# Patient Record
Sex: FEMALE | Race: WHITE | HISPANIC OR LATINO | Employment: FULL TIME | URBAN - METROPOLITAN AREA
[De-identification: names, ages, dates, MRNs, and addresses within clinical notes are randomized per-mention and may not be internally consistent; named-entity substitution may affect disease eponyms.]

---

## 2023-05-25 ENCOUNTER — APPOINTMENT (OUTPATIENT)
Dept: PRIMARY CARE | Facility: CLINIC | Age: 38
End: 2023-05-25
Payer: COMMERCIAL

## 2023-08-16 ENCOUNTER — APPOINTMENT (OUTPATIENT)
Dept: PRIMARY CARE | Facility: CLINIC | Age: 38
End: 2023-08-16
Payer: COMMERCIAL

## 2023-10-04 ENCOUNTER — OFFICE VISIT (OUTPATIENT)
Dept: PRIMARY CARE | Facility: CLINIC | Age: 38
End: 2023-10-04
Payer: COMMERCIAL

## 2023-10-04 ENCOUNTER — TELEPHONE (OUTPATIENT)
Dept: CARDIOLOGY | Facility: CLINIC | Age: 38
End: 2023-10-04

## 2023-10-04 VITALS
DIASTOLIC BLOOD PRESSURE: 114 MMHG | OXYGEN SATURATION: 96 % | RESPIRATION RATE: 18 BRPM | HEIGHT: 61 IN | TEMPERATURE: 97 F | SYSTOLIC BLOOD PRESSURE: 155 MMHG | BODY MASS INDEX: 37 KG/M2 | WEIGHT: 196 LBS

## 2023-10-04 DIAGNOSIS — E03.9 HYPOTHYROIDISM, UNSPECIFIED TYPE: ICD-10-CM

## 2023-10-04 DIAGNOSIS — O13.3 GESTATIONAL HYPERTENSION, THIRD TRIMESTER (HHS-HCC): Primary | ICD-10-CM

## 2023-10-04 DIAGNOSIS — E66.9 CLASS 2 OBESITY WITH BODY MASS INDEX (BMI) OF 37.0 TO 37.9 IN ADULT, UNSPECIFIED OBESITY TYPE, UNSPECIFIED WHETHER SERIOUS COMORBIDITY PRESENT: ICD-10-CM

## 2023-10-04 DIAGNOSIS — I10 PRIMARY HYPERTENSION: ICD-10-CM

## 2023-10-04 PROBLEM — O20.9 BLEEDING IN EARLY PREGNANCY (HHS-HCC): Status: ACTIVE | Noted: 2023-10-04

## 2023-10-04 PROBLEM — O13.9 GESTATIONAL HTN (HHS-HCC): Status: ACTIVE | Noted: 2023-10-04

## 2023-10-04 PROBLEM — O20.0: Status: ACTIVE | Noted: 2023-10-04

## 2023-10-04 PROCEDURE — 93000 ELECTROCARDIOGRAM COMPLETE: CPT | Performed by: FAMILY MEDICINE

## 2023-10-04 PROCEDURE — 1036F TOBACCO NON-USER: CPT | Performed by: FAMILY MEDICINE

## 2023-10-04 PROCEDURE — 3080F DIAST BP >= 90 MM HG: CPT | Performed by: FAMILY MEDICINE

## 2023-10-04 PROCEDURE — 3077F SYST BP >= 140 MM HG: CPT | Performed by: FAMILY MEDICINE

## 2023-10-04 PROCEDURE — 3008F BODY MASS INDEX DOCD: CPT | Performed by: FAMILY MEDICINE

## 2023-10-04 PROCEDURE — 99214 OFFICE O/P EST MOD 30 MIN: CPT | Performed by: FAMILY MEDICINE

## 2023-10-04 RX ORDER — LABETALOL 100 MG/1
100 TABLET, FILM COATED ORAL 2 TIMES DAILY
COMMUNITY
Start: 2023-02-06 | End: 2023-11-03 | Stop reason: ALTCHOICE

## 2023-10-04 RX ORDER — LABETALOL 200 MG/1
200 TABLET, FILM COATED ORAL 2 TIMES DAILY
Qty: 60 TABLET | Refills: 5 | Status: SHIPPED | OUTPATIENT
Start: 2023-10-04 | End: 2023-10-26

## 2023-10-04 NOTE — PATIENT INSTRUCTIONS
Please consider exercise program involving walking or some other form of aerobic activity 5 days weekly for 30 minutes... Let's also consider strengthening of large muscle groups like the abdominal muscles or shoulder muscles... Twice weekly with reps of 5/10/15 exercises and gradually increase strength.. This is not heavy strength training but light weight training... Sit ups or back exercise routine.. Please ask for handout if uncertain how to do..This  will help to strengthen your muscles which in turn will help you to lose weight.... You might ask what is the best diet available.. I would strongly encourage you to consider  Weight Watchers.. And as  your  fellow on  Weight Watchers physician attempting to  live this  LIFE  style  choice with you....  I will be glad to give you recommendations on what to eat.. Consider buying Sarita bread.., gayle bagle thin bread.. oikos yogurt... eggs  to eat as hard-boiled... Halo top ice cream for snack... All these are delicious foods which.. when eaten and  being compliant eating three  meals daily  breakfast lunch and dinner, drinking  64 ounces of water daily we will all win together !!!!!!!. This will be a means for you to lose weight... Consider also the smart phone antonette ... My plate.. Or My  fitness  pal..,  as additional possibilities for weight loss... Good  lucswapnil Fernandez!    Discussed medication side effects.  The  risk benefits and treatment options  discussed with patient.       Please schedule follow-up appointment based upon your improvement/failure to improve/chronic medical conditions and physician recommendations during office appointment at the .       Patient advised to go to er if symptoms worsen or to call answering service, or to return to office for additional evaluation    This note was partially  generated using Dragon voice recognition and there may be incorrect words, wording, spelling, or pronunciation errors that were not  corrected prior to committing the note to the medical record.     Hypertension is poorly controlled.  EKG reviewed.  Symptoms include headache.  Sometimes only takes once daily.  Will increase to 200 mg every 12 hours.  Advised to monitor blood pressure at home with low-salt diet.  Please see wrap-up regarding exercise routine.  In light of degree of elevation order home sleep study.  In addition as precaution cardiology opinion with possible stress echo or other.  Cardiology input for blood pressure will be welcome.  Recheck office nurse check BP 2 weeks.  Office follow-up 4 weeks.

## 2023-10-04 NOTE — ASSESSMENT & PLAN NOTE
Hypertension is poorly controlled.  EKG reviewed.  Symptoms include headache.  Sometimes only takes once daily.  Will increase to 200 mg every 12 hours.  Advised to monitor blood pressure at home with low-salt diet.  Please see wrap-up regarding exercise routine.  In light of degree of elevation order home sleep study.  In addition as precaution cardiology opinion with possible stress echo or other.  Cardiology input for blood pressure will be welcome.  Recheck office nurse check BP 2 weeks.  Office follow-up 4 weeks.

## 2023-10-04 NOTE — PROGRESS NOTES
"Subjective   Patient ID: Luis Antonio Clark is a 37 y.o. female who presents for Blood Pressure Check (3rd bp check 155/114).    HPI   Patient is also here for follow-up of hypertension.. Symptoms do  include headache  no confusion visual disturbance dizziness shortness of breath chest pain syncope or palpitations. Recent blood pressure patient has not been checking. By report there is good compliance with treatment. Pertinent medical history includesobesity   Review of Systems  cardiovascular:  no  palpitations or chest  pain  respiratory: no  shortness  of  breath  endocrine:  no polydipsia,  no polyuria  musculoskeletal:  no  myalgia.. no arthralgia  All other  systems discussed  negative   Objective   BP (!) 155/114   Temp 36.1 °C (97 °F)   Resp 18   Ht 1.549 m (5' 1\")   Wt 88.9 kg (196 lb)   SpO2 96%   BMI 37.03 kg/m²     Physical Exam  general: alert oriented x three  HEENT hearing normal to voice  Neck supple  Lungs respirations non-labored.  Cardiovascular: no peripheral edema  Skin: warm and dry without rash  Psych: judgement and insight normal  Musculoskeletal:  ambulation normal,    lymph:negative cervical  LYMPADENOPATHY  thyroid: non palpable enlargement   CMP dated 3/19 showed kidney function within normal limits liver test within the visualized EKG/  Assessment/Plan   Problem List Items Addressed This Visit             ICD-10-CM    Gestational HTN - Primary O13.9    Hypertension I10     Hypertension is poorly controlled.  EKG reviewed.  Symptoms include headache.  Sometimes only takes once daily.  Will increase to 200 mg every 12 hours.  Advised to monitor blood pressure at home with low-salt diet.  Please see wrap-up regarding exercise routine.  In light of degree of elevation order home sleep study.  In addition as precaution cardiology opinion with possible stress echo or other.  Cardiology input for blood pressure will be welcome.  Recheck office nurse check BP 2 weeks.  Office follow-up 4 " weeks.         Obesity E66.9   See wrap up   EKG shows normal sinus rhythm nonspecific ST segment changes  CT interval 170 ms  QRS 94 ms  QTc 445 ms  Heart rate 64 bpm..  Monitor pulse rate at home with increased med under 50 call

## 2023-10-06 ENCOUNTER — LAB (OUTPATIENT)
Dept: LAB | Facility: LAB | Age: 38
End: 2023-10-06
Payer: COMMERCIAL

## 2023-10-06 DIAGNOSIS — O13.3 GESTATIONAL HYPERTENSION, THIRD TRIMESTER (HHS-HCC): ICD-10-CM

## 2023-10-06 DIAGNOSIS — E03.9 HYPOTHYROIDISM, UNSPECIFIED TYPE: ICD-10-CM

## 2023-10-06 DIAGNOSIS — I10 PRIMARY HYPERTENSION: ICD-10-CM

## 2023-10-06 LAB
ALBUMIN SERPL BCP-MCNC: 4.1 G/DL (ref 3.4–5)
ALP SERPL-CCNC: 99 U/L (ref 33–110)
ALT SERPL W P-5'-P-CCNC: 33 U/L (ref 7–45)
ANION GAP SERPL CALC-SCNC: 14 MMOL/L (ref 10–20)
AST SERPL W P-5'-P-CCNC: 27 U/L (ref 9–39)
BILIRUB DIRECT SERPL-MCNC: 0.1 MG/DL (ref 0–0.3)
BILIRUB SERPL-MCNC: 0.6 MG/DL (ref 0–1.2)
BUN SERPL-MCNC: 8 MG/DL (ref 6–23)
CALCIUM SERPL-MCNC: 9.2 MG/DL (ref 8.6–10.3)
CHLORIDE SERPL-SCNC: 112 MMOL/L (ref 98–107)
CHOLEST SERPL-MCNC: 175 MG/DL (ref 0–199)
CHOLESTEROL/HDL RATIO: 6.8
CO2 SERPL-SCNC: 26 MMOL/L (ref 21–32)
CREAT SERPL-MCNC: 0.71 MG/DL (ref 0.5–1.05)
ERYTHROCYTE [DISTWIDTH] IN BLOOD BY AUTOMATED COUNT: 15.3 % (ref 11.5–14.5)
GFR SERPL CREATININE-BSD FRML MDRD: >90 ML/MIN/1.73M*2
GLUCOSE SERPL-MCNC: 86 MG/DL (ref 74–99)
HCT VFR BLD AUTO: 36.1 % (ref 36–46)
HDLC SERPL-MCNC: 25.7 MG/DL
HGB BLD-MCNC: 12 G/DL (ref 12–16)
LDLC SERPL CALC-MCNC: 84 MG/DL (ref 130–180)
MCH RBC QN AUTO: 26.4 PG (ref 26–34)
MCHC RBC AUTO-ENTMCNC: 33.2 G/DL (ref 32–36)
MCV RBC AUTO: 79 FL (ref 80–100)
NON HDL CHOLESTEROL: 149 MG/DL (ref 0–149)
NRBC BLD-RTO: 0 /100 WBCS (ref 0–0)
PLATELET # BLD AUTO: 337 X10*3/UL (ref 150–450)
PMV BLD AUTO: 9.4 FL (ref 7.5–11.5)
POTASSIUM SERPL-SCNC: 4.1 MMOL/L (ref 3.5–5.3)
PROT SERPL-MCNC: 7.5 G/DL (ref 6.4–8.2)
RBC # BLD AUTO: 4.55 X10*6/UL (ref 4–5.2)
SODIUM SERPL-SCNC: 148 MMOL/L (ref 136–145)
TRIGL SERPL-MCNC: 327 MG/DL (ref 0–149)
TSH SERPL-ACNC: 1.13 MIU/L (ref 0.44–3.98)
VLDL: 65 MG/DL (ref 0–40)
WBC # BLD AUTO: 7.3 X10*3/UL (ref 4.4–11.3)

## 2023-10-06 PROCEDURE — 84443 ASSAY THYROID STIM HORMONE: CPT

## 2023-10-06 PROCEDURE — 80053 COMPREHEN METABOLIC PANEL: CPT

## 2023-10-06 PROCEDURE — 85027 COMPLETE CBC AUTOMATED: CPT

## 2023-10-06 PROCEDURE — 80061 LIPID PANEL: CPT

## 2023-10-06 PROCEDURE — 36415 COLL VENOUS BLD VENIPUNCTURE: CPT

## 2023-10-06 PROCEDURE — 82248 BILIRUBIN DIRECT: CPT

## 2023-10-09 DIAGNOSIS — E87.0 HYPERNATREMIA: Primary | ICD-10-CM

## 2023-10-10 ENCOUNTER — OFFICE VISIT (OUTPATIENT)
Dept: PRIMARY CARE | Facility: CLINIC | Age: 38
End: 2023-10-10
Payer: COMMERCIAL

## 2023-10-10 VITALS — DIASTOLIC BLOOD PRESSURE: 89 MMHG | SYSTOLIC BLOOD PRESSURE: 130 MMHG

## 2023-10-10 DIAGNOSIS — I10 PRIMARY HYPERTENSION: ICD-10-CM

## 2023-10-10 PROCEDURE — 3075F SYST BP GE 130 - 139MM HG: CPT | Performed by: FAMILY MEDICINE

## 2023-10-10 PROCEDURE — 99024 POSTOP FOLLOW-UP VISIT: CPT | Performed by: FAMILY MEDICINE

## 2023-10-10 PROCEDURE — 1036F TOBACCO NON-USER: CPT | Performed by: FAMILY MEDICINE

## 2023-10-10 PROCEDURE — 3079F DIAST BP 80-89 MM HG: CPT | Performed by: FAMILY MEDICINE

## 2023-10-10 PROCEDURE — 3008F BODY MASS INDEX DOCD: CPT | Performed by: FAMILY MEDICINE

## 2023-10-11 ENCOUNTER — APPOINTMENT (OUTPATIENT)
Dept: CARDIOLOGY | Facility: CLINIC | Age: 38
End: 2023-10-11
Payer: COMMERCIAL

## 2023-10-12 ENCOUNTER — APPOINTMENT (OUTPATIENT)
Dept: CARDIOLOGY | Facility: CLINIC | Age: 38
End: 2023-10-12
Payer: COMMERCIAL

## 2023-10-26 DIAGNOSIS — I10 PRIMARY HYPERTENSION: ICD-10-CM

## 2023-10-26 RX ORDER — LABETALOL 200 MG/1
1 TABLET, FILM COATED ORAL 2 TIMES DAILY
Qty: 180 TABLET | Refills: 3 | Status: SHIPPED | OUTPATIENT
Start: 2023-10-26 | End: 2023-11-03 | Stop reason: SDUPTHER

## 2023-10-31 ENCOUNTER — LAB (OUTPATIENT)
Dept: LAB | Facility: LAB | Age: 38
End: 2023-10-31
Payer: COMMERCIAL

## 2023-10-31 DIAGNOSIS — E87.0 HYPERNATREMIA: ICD-10-CM

## 2023-10-31 LAB
ANION GAP SERPL CALC-SCNC: 11 MMOL/L (ref 10–20)
BUN SERPL-MCNC: 9 MG/DL (ref 6–23)
CALCIUM SERPL-MCNC: 9.4 MG/DL (ref 8.6–10.3)
CHLORIDE SERPL-SCNC: 102 MMOL/L (ref 98–107)
CO2 SERPL-SCNC: 29 MMOL/L (ref 21–32)
CREAT SERPL-MCNC: 0.79 MG/DL (ref 0.5–1.05)
GFR SERPL CREATININE-BSD FRML MDRD: >90 ML/MIN/1.73M*2
GLUCOSE SERPL-MCNC: 101 MG/DL (ref 74–99)
POTASSIUM SERPL-SCNC: 4.4 MMOL/L (ref 3.5–5.3)
SODIUM SERPL-SCNC: 138 MMOL/L (ref 136–145)

## 2023-10-31 PROCEDURE — 80048 BASIC METABOLIC PNL TOTAL CA: CPT

## 2023-10-31 PROCEDURE — 36415 COLL VENOUS BLD VENIPUNCTURE: CPT

## 2023-11-03 ENCOUNTER — OFFICE VISIT (OUTPATIENT)
Dept: PRIMARY CARE | Facility: CLINIC | Age: 38
End: 2023-11-03
Payer: COMMERCIAL

## 2023-11-03 VITALS
HEIGHT: 61 IN | WEIGHT: 193 LBS | RESPIRATION RATE: 18 BRPM | OXYGEN SATURATION: 97 % | BODY MASS INDEX: 36.44 KG/M2 | HEART RATE: 76 BPM | DIASTOLIC BLOOD PRESSURE: 71 MMHG | TEMPERATURE: 97 F | SYSTOLIC BLOOD PRESSURE: 110 MMHG

## 2023-11-03 DIAGNOSIS — I10 PRIMARY HYPERTENSION: Primary | ICD-10-CM

## 2023-11-03 PROCEDURE — 3078F DIAST BP <80 MM HG: CPT | Performed by: FAMILY MEDICINE

## 2023-11-03 PROCEDURE — 1036F TOBACCO NON-USER: CPT | Performed by: FAMILY MEDICINE

## 2023-11-03 PROCEDURE — 99213 OFFICE O/P EST LOW 20 MIN: CPT | Performed by: FAMILY MEDICINE

## 2023-11-03 PROCEDURE — 3008F BODY MASS INDEX DOCD: CPT | Performed by: FAMILY MEDICINE

## 2023-11-03 PROCEDURE — 3074F SYST BP LT 130 MM HG: CPT | Performed by: FAMILY MEDICINE

## 2023-11-03 RX ORDER — LABETALOL 200 MG/1
1 TABLET, FILM COATED ORAL 2 TIMES DAILY
Qty: 180 TABLET | Refills: 3 | Status: SHIPPED | OUTPATIENT
Start: 2023-11-03

## 2023-11-03 NOTE — PROGRESS NOTES
"Subjective   Patient ID: Luis Antonio Clark is a 37 y.o. female who presents for Follow-up.    HPI   Patient is also here for follow-up of hypertension.. Symptoms do not include headache  resolved since last     visit  no confusion visual disturbance dizziness shortness of breath chest pain syncope or palpitations. Recent blood pressure patient has   been checking. By report there is good compliance with treatment. Pertinent medical history includesobesity   Review of Systems  cardiovascular:  no  palpitations or chest  pain  respiratory: no  shortness  of  breath  endocrine:  no polydipsia,  no polyuria  musculoskeletal:  no  myalgia.. no arthralgia  All other  systems discussed  negative   Review of Systems  .cardiovascular:  no  palpitations or chest  pain  respiratory: no  shortness  of  breath  endocrine:  no polydipsia,  no polyuria  musculoskeletal:  no  myalgia.. no arthralgia  All other  systems discussed  negative   Objective   /71   Pulse 76   Temp 36.1 °C (97 °F)   Resp 18   Ht 1.549 m (5' 1\")   Wt 87.5 kg (193 lb)   LMP  (LMP Unknown)   SpO2 97%   BMI 36.47 kg/m²     Physical Exam  general: alert oriented x three  HEENT hearing normal to voice  Neck supple  Lungs respirations non-labored.  Cardiovascular: no peripheral edema  Skin: warm and dry without rash  Psych: judgement and insight normal  Musculoskeletal:  ambulation normal,    lymph:negative cervical  LYMPADENOPATHY  thyroid: non palpable enlargement   Assessment/Plan   Problem List Items Addressed This Visit             ICD-10-CM    Hypertension - Primary I10     Patient is also here for follow-up of hypertension.. Symptoms do not include headache confusion visual disturbance dizziness shortness of breath chest pain syncope or palpitations. Recent blood pressure patient has not been checking. By report there is good compliance with treatment. Pertinent medical history includes obesity    stable and continue meds                "

## 2023-11-03 NOTE — ASSESSMENT & PLAN NOTE
Patient is also here for follow-up of hypertension.. Symptoms do not include headache confusion visual disturbance dizziness shortness of breath chest pain syncope or palpitations. Recent blood pressure patient has not been checking. By report there is good compliance with treatment. Pertinent medical history includes obesity    stable and continue meds

## 2023-11-03 NOTE — PATIENT INSTRUCTIONS

## 2023-11-17 ENCOUNTER — OFFICE VISIT (OUTPATIENT)
Dept: CARDIOLOGY | Facility: CLINIC | Age: 38
End: 2023-11-17
Payer: COMMERCIAL

## 2023-11-17 VITALS
HEART RATE: 76 BPM | SYSTOLIC BLOOD PRESSURE: 132 MMHG | WEIGHT: 193 LBS | BODY MASS INDEX: 35.51 KG/M2 | HEIGHT: 62 IN | DIASTOLIC BLOOD PRESSURE: 88 MMHG

## 2023-11-17 DIAGNOSIS — I10 PRIMARY HYPERTENSION: ICD-10-CM

## 2023-11-17 DIAGNOSIS — Z78.9 NEVER SMOKED CIGARETTES: ICD-10-CM

## 2023-11-17 PROCEDURE — 3079F DIAST BP 80-89 MM HG: CPT | Performed by: INTERNAL MEDICINE

## 2023-11-17 PROCEDURE — 3008F BODY MASS INDEX DOCD: CPT | Performed by: INTERNAL MEDICINE

## 2023-11-17 PROCEDURE — 1036F TOBACCO NON-USER: CPT | Performed by: INTERNAL MEDICINE

## 2023-11-17 PROCEDURE — 3075F SYST BP GE 130 - 139MM HG: CPT | Performed by: INTERNAL MEDICINE

## 2023-11-17 PROCEDURE — 99203 OFFICE O/P NEW LOW 30 MIN: CPT | Performed by: INTERNAL MEDICINE

## 2023-11-17 RX ORDER — BISMUTH SUBSALICYLATE 262 MG
1 TABLET,CHEWABLE ORAL DAILY
COMMUNITY

## 2023-11-17 NOTE — PROGRESS NOTES
CARDIOLOGY OFFICE VISIT      CHIEF COMPLAINT  Hypertension    HISTORY OF PRESENT ILLNESS  The patient is being seen today for cardiac evaluation because of hypertension.  The patient states she has had hypertension ever since her second child was born.  She states that recently she is lost about 6 pounds and had her labetalol increased.  Her blood pressure has come under better control.  She states when she first gets up in the morning and takes her blood pressure before she takes her medicine it may be a little bit elevated.  She states she gets exercise about 3 times a week.  She is very busy taking care of her 2 boys.  She denies chest discomfort or symptoms of myocardial ischemia.  She denies any significant dyspnea activities.  She denies palpitations and syncope.  She is not having any problem with her labetalol.  I told her that I would continue to try to watch what she eats closely, continue to lose weight, and continue to exercise regularly and not recommend any further change in her blood pressure medicine at this time.  I think she is on appropriate medication at this time.  I did go over her recent lab work with her.    Impression:  Hypertension  Obesity    Please excuse any errors in grammar or translation related to this dictation.  Voice recognition software was utilized to prepare this document.    Past Medical History  Past Medical History:   Diagnosis Date    Allergic 2000    Hypertension 02/01/2016       Social History  Social History     Tobacco Use    Smoking status: Never    Smokeless tobacco: Never   Vaping Use    Vaping Use: Never used   Substance Use Topics    Alcohol use: Not Currently     Comment: rarely drink if ever    Drug use: Never       Family History     Family History   Problem Relation Name Age of Onset    Hyperthyroidism Mother      Hypertension Father Arnulfo Adama     Diabetes Father Arnulfo Adama     Diabetes type II Father Arnulfo Galan     Stroke Maternal Grandmother  Katerina Young     Heart disease Maternal Grandfather Sai Young     Leukemia Maternal Grandfather Sai Young     Cancer Maternal Grandfather Sai Young     Heart attack Maternal Grandfather Sai Young     Hypotension Paternal Grandmother Yessy Galan     Asthma Paternal Grandmother Yessy Brannonquez     COPD Paternal Grandmother Yessy Brannonquez     Arthritis Paternal Grandmother Yessy Yeungasquez     Anemia Paternal Grandmother Yessy Brannonquez     Asthma Father's Brother Talha Galan         Allergies:  Allergies   Allergen Reactions    Ibuprofen Hives and Itching     TAKES ALEVE AT HOME        Outpatient Medications:  Current Outpatient Medications   Medication Instructions    labetalol (NORMODYNE) 200 mg, oral, 2 times daily    multivitamin tablet 1 tablet, oral, Daily          REVIEW OF SYSTEMS  Review of Systems   All other systems reviewed and are negative.        VITALS  Vitals:    11/17/23 1209   BP: (!) 132/100   Pulse: 76       PHYSICAL EXAM  Vitals and nursing note reviewed.   Constitutional:       Appearance: Healthy appearance.   Eyes:      Conjunctiva/sclera: Conjunctivae normal.      Pupils: Pupils are equal, round, and reactive to light.   Pulmonary:      Effort: Pulmonary effort is normal.      Breath sounds: Normal breath sounds.   Cardiovascular:      PMI at left midclavicular line. Normal rate. Regular rhythm.      Murmurs: There is no murmur.      No gallop.  No click. No rub.   Pulses:     Intact distal pulses.   Edema:     Peripheral edema absent.   Musculoskeletal: Normal range of motion. Skin:     General: Skin is warm and dry.   Neurological:      Mental Status: Alert and oriented to person, place and time.           ASSESSMENT AND PLAN  Diagnoses and all orders for this visit:  Primary hypertension  BMI 35.0-35.9,adult  Never smoked cigarettes      [unfilled]

## 2023-11-17 NOTE — PATIENT INSTRUCTIONS
Follow up office visit in 1 year.    Continue same medications/treatment.  Patient educated on proper medication use.  Please bring all medicines, vitamins and herbal supplements with you when you come to the office.    I, Vicky Solis LPN, am scribing for and in the presence of Dr. Talha Lyon MD, FACC

## 2023-12-24 ENCOUNTER — CLINICAL SUPPORT (OUTPATIENT)
Dept: SLEEP MEDICINE | Facility: HOSPITAL | Age: 38
End: 2023-12-24
Payer: COMMERCIAL

## 2023-12-24 DIAGNOSIS — I10 PRIMARY HYPERTENSION: ICD-10-CM

## 2023-12-24 PROCEDURE — 95806 SLEEP STUDY UNATT&RESP EFFT: CPT | Performed by: GENERAL PRACTICE

## 2024-01-19 ENCOUNTER — TELEPHONE (OUTPATIENT)
Dept: PRIMARY CARE | Facility: CLINIC | Age: 39
End: 2024-01-19
Payer: COMMERCIAL

## 2024-01-19 NOTE — TELEPHONE ENCOUNTER
----- Message from Benito Fernandez DO sent at 1/18/2024 10:21 PM EST -----  Schedule virtual visit to review sleep study  ----- Message -----  From: Candice Lucero  Sent: 1/18/2024   2:06 PM EST  To: Benito Fernandez DO

## 2024-02-06 ENCOUNTER — TELEMEDICINE (OUTPATIENT)
Dept: PRIMARY CARE | Facility: CLINIC | Age: 39
End: 2024-02-06
Payer: COMMERCIAL

## 2024-02-06 DIAGNOSIS — E66.9 CLASS 2 OBESITY WITH BODY MASS INDEX (BMI) OF 35.0 TO 35.9 IN ADULT, UNSPECIFIED OBESITY TYPE, UNSPECIFIED WHETHER SERIOUS COMORBIDITY PRESENT: ICD-10-CM

## 2024-02-06 DIAGNOSIS — R09.02 HYPOXIA: Primary | ICD-10-CM

## 2024-02-06 PROCEDURE — 3008F BODY MASS INDEX DOCD: CPT | Performed by: FAMILY MEDICINE

## 2024-02-06 PROCEDURE — 1036F TOBACCO NON-USER: CPT | Performed by: FAMILY MEDICINE

## 2024-02-06 PROCEDURE — 99213 OFFICE O/P EST LOW 20 MIN: CPT | Performed by: FAMILY MEDICINE

## 2024-02-06 ASSESSMENT — ENCOUNTER SYMPTOMS: FATIGUE: 0

## 2024-02-06 NOTE — PROGRESS NOTES
Subjective   Patient ID: Luis Antonio Clark is a 38 y.o. female who presents for No chief complaint on file..  This visit was completed via virtual  visit...due to the restrictions of patients schedule. All issues as below were discussed and addressed, but physical examination was limited to visual inspection only and was performed.. IN THIS restricted fashion. iF It was felt that the patient should be evaluated in clinic then  .they were directed there. The patient verbally consented to visit.    HPI  Here for review of sleep study.  Patient is also here for follow-up of hypertension.. Symptoms do not include headache disturbance   Recent blood pressure patient has  been checking. 128/70  By report there is good compliance with treatment. Pertinent medical history includes gestational  hypertension  Review of Systems   Constitutional:  Negative for fatigue.   HENT:          Snoring    cardiovascular:  no  palpitations or chest  pain  respiratory: no  shortness  of  breath  endocrine:  no polydipsia,  no polyuria  musculoskeletal:  no  myalgia.. no arthralgia  All other  systems discussed  negative     Objective   Physical Exam  Physical examination the visual auditory observations  Vital signs none provided by patient  General no acute distress alert and oriented  Head and neck no obvious mass or deformity appreciated no obvious xanthelasma  Lungs no obvious respiratory distress. No audible wheezes noted  Abdomen..Obesity appreciated  Extremities no visual  abnormalties appreciated noted  Neuro. No visually apparent deficits  Psychiatric. Well with normal mood   Labs    Discussed with patient sleep study and reviewed home sleep apnea testing.  Results indicate abnormalities that is total number of obstructive apneas was 3.  In addition patient had baseline O2 sat of 96.7% with report indicating spent 22 minutes as sat less than 90% and 1.7 minutes less than 88%.      06 testing about mild abnormality and  recommendation was for auto CPAP at 5-15 cmH2O with committed patient followed by machine download to ensure control respiratory events and clinical follow-up.    Discussed with patient instituting treatment/pulmonary referral and pulmonary referral placed in chart.    Assessment/Plan   Problem List Items Addressed This Visit       Obesity     Obesity   see wrap up          Hypoxia - Primary     Baseline oxygen level is and slight decrease.  Discussed continue weight loss as therapeutic and beneficial.  Continue exercise routine.  She denies symptoms of headaches in the morning or morning fatigue or muscle cramps or pain and discussed pros and cons of additional workup/pulmonary referral and referral placed

## 2024-02-06 NOTE — PATIENT INSTRUCTIONS
Please consider exercise program involving walking or some other form of aerobic activity 5 days weekly for 30 minutes... Let's also consider strengthening of large muscle groups like the abdominal muscles or shoulder muscles... Twice weekly with reps of 5/10/15 exercises and gradually increase strength.. This is not heavy strength training but light weight training... Sit ups or back exercise routine.. Please ask for handout if uncertain how to do..This  will help to strengthen your muscles which in turn will help you to lose weight.... You might ask what is the best diet available.. I would strongly encourage you to consider  Weight Watchers.. And as  your  fellow on  Weight Watchers physician attempting to  live this  LIFE  style  choice with you....  I will be glad to give you recommendations on what to eat.. Consider buying Sarita bread.., gayle bagle thin bread.. oikos yogurt... eggs  to eat as hard-boiled... Halo top ice cream for snack... All these are delicious foods which.. when eaten and  being compliant eating three  meals daily  breakfast lunch and dinner, drinking  64 ounces of water daily we will all win together !!!!!!!. This will be a means for you to lose weight... Consider also the smart phone antonette ... My plate.. Or My  fitness  pal..,  as additional possibilities for weight loss... Good  lucswapnil Fernandez!    Discussed medication side effects.  The  risk benefits and treatment options  discussed with patient.       Please schedule follow-up appointment based upon your improvement/failure to improve/chronic medical conditions and physician recommendations during office appointment at the .       Patient advised to go to er if symptoms worsen or to call answering service, or to return to office for additional evaluation    This note was partially  generated using Dragon voice recognition and there may be incorrect words, wording, spelling, or pronunciation errors that were not  corrected prior to committing the note to the medical record.     Patient encouraged to sleep on side or stomach.  Will get second opinion with pulmonary specialist.  Call if trouble otherwise continue meds

## 2024-02-06 NOTE — ASSESSMENT & PLAN NOTE
Baseline oxygen level is and slight decrease.  Discussed continue weight loss as therapeutic and beneficial.  Continue exercise routine.  She denies symptoms of headaches in the morning or morning fatigue or muscle cramps or pain and discussed pros and cons of additional workup/pulmonary referral and referral placed

## 2024-02-26 NOTE — PROGRESS NOTES
Patient: Luis Antonio Clark    [id number]  : 1985 -- AGE 38 y.o.    Provider: Marcy Lazaro CNP     Location [facility]   Service Date: 3/4/2024            [facility]  New Visit Note      HISTORY OF PRESENT ILLNESS     The patient's referring provider is: No ref. provider found    HISTORY OF PRESENT ILLNESS   Luis Antonio Clark is a 38 y.o. female who presents to a [facility] for an evaluation with concerns of Lung Eval (Patient had a sleep study with high blood pressure and want to konw if theres any coalation.). I have independently interviewed and examined the patient in the office and reviewed available records.    Current History    On today's visit, the patient reports during the 2-3 rd trimester of her 2nd pregnancy she developed HTN, in  in Oct it went up again. She did not kow if weight related, she lost 10-12 lbs. She was seen by Cardiologist for workup. She had sleep study - her MD did not get CPAP ordered.   In Oct 2023 had the flu she had cough then improved. In  had URI - There was concern for asthma.  At baseline,  denies dyspnea on exertion/ none at rest. She is active in her everyday life and carries loads and does strenuous exercise. He is only troubled by breathlessness except on strenuous exercise (mMRC 0).   She speed walks around the neighborhood 30-45 min around the neighborhood.       Denies orthopnea, pnd, or thelma.  Has lost X 12 pounds in the last X 3months.  Relates   chronic cough, at night has a clear productive phlegm.  She has occ wheezing at night evening time. Denies green, blood streaks. No hemoptysis. No fever or shivering chills. Has a runny nose, and a tingling sensation in the back of his throat - takes daily allegra since September Denies chest pain or heartburn.     Previous pulmonary history:  no history of recurrent infections, or lung disease as a child.  No previous lung hx,  never on oxygen or inhaler therapy.     Inhalers/nebulized medications: has albuterol using every night it helps    Hospitalization History: Not been hospitalized over the last year for breathing related problem.    Sleep history:  Complains of snoring, denies apnea, denies feeling tired during the day, and taking naps during the day.     ALLERGIES AND MEDICATIONS     ALLERGIES  Allergies   Allergen Reactions    Ibuprofen Hives and Itching     TAKES ALEVE AT HOME       MEDICATIONS  Current Outpatient Medications   Medication Sig Dispense Refill    labetalol (Normodyne) 200 mg tablet Take 1 tablet (200 mg) by mouth 2 times a day. 180 tablet 3    multivitamin tablet Take 1 tablet by mouth once daily.       No current facility-administered medications for this visit.         PAST HISTORY     PAST MEDICAL HISTORY  She  has a past medical history of Allergic () and Hypertension (2016).    PAST SURGICAL HISTORY  Past Surgical History:   Procedure Laterality Date     SECTION, CLASSIC       SECTION, LOW TRANSVERSE  2016       IMMUNIZATION HISTORY  Immunization History   Administered Date(s) Administered    Flu vaccine (IIV4), preservative free *Check age/dose* 2022    Influenza, Unspecified 2023    Tdap vaccine, age 7 year and older (BOOSTRIX, ADACEL) 2011       SOCIAL HISTORY  She  reports that she has never smoked. She has never used smokeless tobacco. She reports that she does not currently use alcohol. She reports that she does not use drugs. She Patient     OCCUPATIONAL/ENVIRONMENTAL HISTORY  Currently works as:  at a [employer]   DOES/DOES NOT EC: does not have known exposure to asbestos, silica, beryllium or inhaled metals.  DOES/DOES NOT EC: does not have exposure to birds or exotic animals. Has a cat     FAMILY HISTORY  Family History   Problem Relation Name Age of Onset    Hyperthyroidism Mother      Hypertension Father Arnulfo Galan      "Diabetes Father Arnulfo Galan     Diabetes type II Father Arnulfo Galan     Stroke Maternal Grandmother Katerina Young     Heart disease Maternal Grandfather Sai Young     Leukemia Maternal Grandfather Sai Young     Cancer Maternal Grandfather Sai Young     Heart attack Maternal Grandfather Sai Young     Hypotension Paternal Grandmother Yessy Galan     Asthma Paternal Grandmother Yessy Galan     COPD Paternal Grandmother Yessy Galan     Arthritis Paternal Grandmother Yessy Galan     Anemia Paternal Grandmother Yessy Galan     Asthma Father's Brother Talha Galan      DOES/DOES NOT EC: does have a family history of pulmonary disease.  DOES/DOES NOT EC: does have a family history of cancer.  DOES/DOES NOT EC: does have a family history of autoimmune disorders. Mom has thyroid, brother has crohns    RESULTS/DATA     Pulmonary Function Test Results     Failed to redirect to the Timeline version of the [employer] SmartLink.        No results found.    Chest Radiograph      No results found.      Chest CT Scan      No results found.      Echocardiogram      No results found.         REVIEW OF SYSTEMS     REVIEW OF SYSTEMS  Review of Systems   All other systems reviewed and are negative.        PHYSICAL EXAM     VITAL SIGNS: /89 (BP Location: Right arm, Patient Position: Sitting, BP Cuff Size: Large adult)   Pulse 73   Temp 36.8 °C (98.2 °F) (Temporal)   Resp 16   Ht 1.575 m (5' 2\")   Wt 86.2 kg (190 lb)   SpO2 98%   BMI 34.75 kg/m²      CURRENT WEIGHT: [unfilled]  BMI: [unfilled]  PREVIOUS WEIGHTS:  Wt Readings from Last 3 Encounters:   03/04/24 86.2 kg (190 lb)   11/17/23 87.5 kg (193 lb)   11/03/23 87.5 kg (193 lb)       Physical Exam  Constitutional:       Appearance: Normal appearance.   HENT:      Head: Normocephalic and atraumatic.      Right Ear: External ear normal.      Left Ear: External ear normal.      Nose: Nose normal.      Mouth/Throat:      " Mouth: Mucous membranes are moist.      Pharynx: Oropharynx is clear.   Eyes:      Extraocular Movements: Extraocular movements intact.      Conjunctiva/sclera: Conjunctivae normal.      Pupils: Pupils are equal, round, and reactive to light.   Cardiovascular:      Rate and Rhythm: Normal rate and regular rhythm.      Pulses: Normal pulses.      Heart sounds: Normal heart sounds.   Pulmonary:      Effort: Pulmonary effort is normal.      Breath sounds: Normal breath sounds.   Abdominal:      General: Bowel sounds are normal.      Palpations: Abdomen is soft.   Musculoskeletal:         General: Normal range of motion.      Cervical back: Normal range of motion and neck supple.   Skin:     General: Skin is warm and dry.   Neurological:      General: No focal deficit present.      Mental Status: She is alert and oriented to person, place, and time. Mental status is at baseline.   Psychiatric:         Mood and Affect: Mood normal.         Behavior: Behavior normal.         Thought Content: Thought content normal.         Judgment: Judgment normal.         ASSESSMENT/PLAN     Ms. Clark is a 38 y.o. female and  has a past medical history of Allergic (2000) and Hypertension (02/01/2016). She was referred to the [facility] for evaluation of cough, allergic rhinitis, and     Problem List and Orders      Assessment and Plan / Recommendations:  Problem List Items Addressed This Visit    None       Cough dyspnea with exertion   - cough improves with albuterol as needed - using nightly  Will obtain Pulmonary function test, 6 minute walk and FENO   Start low dose ICS/LABA - Breo 1 puff twice a day and rinse after use     Radames  Sleep study on 12/24/2023 reveals - AHI3% at 12.6 per hour- Supine AHI 3% 14.3 per hour, non supine AHI3% 8% per hour. O2 efrain 64.3%  PCP ordered Pap therapy Auto pap 5-15cm H2O  F/U Sleep provider in 2-3 months    allergic rhinitis  - purchase nasal saline over the  counter - use 2-3 x per day to rinse out nasal passages and keep them moist   - start fluticasone (flonase) 1 spray each nostril 1-2 x per day - remember to aim towards your ear   - cont allegra         Thank you for visiting the Pulmonary clinic today!       Return to clinic after __6-8___weeks and after PFTs, CT scan complete  or sooner if needed   Marcy Lazaro CNP  My office number is [phone] -     Best way to get a hold of me is to call my office --> Please do not send me follow my health messages  Any test results will be discussed at next visit -- please make sure to make a follow up appt after testing.

## 2024-03-04 ENCOUNTER — OFFICE VISIT (OUTPATIENT)
Dept: PULMONOLOGY | Facility: CLINIC | Age: 39
End: 2024-03-04
Payer: COMMERCIAL

## 2024-03-04 ENCOUNTER — LAB (OUTPATIENT)
Dept: LAB | Facility: LAB | Age: 39
End: 2024-03-04
Payer: COMMERCIAL

## 2024-03-04 VITALS
HEIGHT: 62 IN | WEIGHT: 190 LBS | OXYGEN SATURATION: 98 % | DIASTOLIC BLOOD PRESSURE: 89 MMHG | SYSTOLIC BLOOD PRESSURE: 127 MMHG | BODY MASS INDEX: 34.96 KG/M2 | HEART RATE: 73 BPM | TEMPERATURE: 98.2 F | RESPIRATION RATE: 16 BRPM

## 2024-03-04 DIAGNOSIS — J45.20 MILD INTERMITTENT EXTRINSIC ASTHMA WITHOUT COMPLICATION (HHS-HCC): Primary | ICD-10-CM

## 2024-03-04 DIAGNOSIS — J30.89 ALLERGIC RHINITIS DUE TO OTHER ALLERGIC TRIGGER, UNSPECIFIED SEASONALITY: ICD-10-CM

## 2024-03-04 DIAGNOSIS — R09.02 HYPOXIA: ICD-10-CM

## 2024-03-04 DIAGNOSIS — G47.33 OSA (OBSTRUCTIVE SLEEP APNEA): ICD-10-CM

## 2024-03-04 LAB
BASOPHILS # BLD AUTO: 0.03 X10*3/UL (ref 0–0.1)
BASOPHILS NFR BLD AUTO: 0.3 %
EOSINOPHIL # BLD AUTO: 0.56 X10*3/UL (ref 0–0.7)
EOSINOPHIL NFR BLD AUTO: 6.3 %
ERYTHROCYTE [DISTWIDTH] IN BLOOD BY AUTOMATED COUNT: 13.9 % (ref 11.5–14.5)
HCT VFR BLD AUTO: 37 % (ref 36–46)
HGB BLD-MCNC: 12.2 G/DL (ref 12–16)
IGE SERPL-ACNC: 26 IU/ML (ref 0–214)
IMM GRANULOCYTES # BLD AUTO: 0.03 X10*3/UL (ref 0–0.7)
IMM GRANULOCYTES NFR BLD AUTO: 0.3 % (ref 0–0.9)
LYMPHOCYTES # BLD AUTO: 2.33 X10*3/UL (ref 1.2–4.8)
LYMPHOCYTES NFR BLD AUTO: 26.4 %
MCH RBC QN AUTO: 26.9 PG (ref 26–34)
MCHC RBC AUTO-ENTMCNC: 33 G/DL (ref 32–36)
MCV RBC AUTO: 82 FL (ref 80–100)
MONOCYTES # BLD AUTO: 0.56 X10*3/UL (ref 0.1–1)
MONOCYTES NFR BLD AUTO: 6.3 %
NEUTROPHILS # BLD AUTO: 5.33 X10*3/UL (ref 1.2–7.7)
NEUTROPHILS NFR BLD AUTO: 60.4 %
NRBC BLD-RTO: 0 /100 WBCS (ref 0–0)
PLATELET # BLD AUTO: 338 X10*3/UL (ref 150–450)
RBC # BLD AUTO: 4.53 X10*6/UL (ref 4–5.2)
WBC # BLD AUTO: 8.8 X10*3/UL (ref 4.4–11.3)

## 2024-03-04 PROCEDURE — 1036F TOBACCO NON-USER: CPT | Performed by: NURSE PRACTITIONER

## 2024-03-04 PROCEDURE — 3008F BODY MASS INDEX DOCD: CPT | Performed by: NURSE PRACTITIONER

## 2024-03-04 PROCEDURE — 82785 ASSAY OF IGE: CPT

## 2024-03-04 PROCEDURE — 86003 ALLG SPEC IGE CRUDE XTRC EA: CPT

## 2024-03-04 PROCEDURE — 3074F SYST BP LT 130 MM HG: CPT | Performed by: NURSE PRACTITIONER

## 2024-03-04 PROCEDURE — 3079F DIAST BP 80-89 MM HG: CPT | Performed by: NURSE PRACTITIONER

## 2024-03-04 PROCEDURE — 99214 OFFICE O/P EST MOD 30 MIN: CPT | Performed by: NURSE PRACTITIONER

## 2024-03-04 PROCEDURE — 85025 COMPLETE CBC W/AUTO DIFF WBC: CPT

## 2024-03-04 PROCEDURE — 36415 COLL VENOUS BLD VENIPUNCTURE: CPT

## 2024-03-04 RX ORDER — FLUTICASONE FUROATE AND VILANTEROL TRIFENATATE 50; 25 UG/1; UG/1
1 POWDER RESPIRATORY (INHALATION) 2 TIMES DAILY
Qty: 60 EACH | Refills: 3 | Status: SHIPPED | OUTPATIENT
Start: 2024-03-04 | End: 2024-05-20 | Stop reason: SDUPTHER

## 2024-03-04 ASSESSMENT — ENCOUNTER SYMPTOMS
DEPRESSION: 0
LOSS OF SENSATION IN FEET: 0
OCCASIONAL FEELINGS OF UNSTEADINESS: 0

## 2024-03-04 ASSESSMENT — PATIENT HEALTH QUESTIONNAIRE - PHQ9
2. FEELING DOWN, DEPRESSED OR HOPELESS: NOT AT ALL
SUM OF ALL RESPONSES TO PHQ9 QUESTIONS 1 AND 2: 0
1. LITTLE INTEREST OR PLEASURE IN DOING THINGS: NOT AT ALL

## 2024-03-04 ASSESSMENT — PAIN SCALES - GENERAL: PAINLEVEL: 0-NO PAIN

## 2024-03-04 ASSESSMENT — COLUMBIA-SUICIDE SEVERITY RATING SCALE - C-SSRS
1. IN THE PAST MONTH, HAVE YOU WISHED YOU WERE DEAD OR WISHED YOU COULD GO TO SLEEP AND NOT WAKE UP?: NO
6. HAVE YOU EVER DONE ANYTHING, STARTED TO DO ANYTHING, OR PREPARED TO DO ANYTHING TO END YOUR LIFE?: NO
2. HAVE YOU ACTUALLY HAD ANY THOUGHTS OF KILLING YOURSELF?: NO

## 2024-03-05 LAB
A ALTERNATA IGE QN: <0.1 KU/L
A FUMIGATUS IGE QN: <0.1 KU/L
BERMUDA GRASS IGE QN: <0.1 KU/L
BOXELDER IGE QN: <0.1 KU/L
C HERBARUM IGE QN: <0.1 KU/L
CALIF WALNUT POLN IGE QN: <0.1 KU/L
CAT DANDER IGE QN: 3.93 KU/L
CMN PIGWEED IGE QN: <0.1 KU/L
COMMON RAGWEED IGE QN: <0.1 KU/L
COTTONWOOD IGE QN: <0.1 KU/L
D FARINAE IGE QN: 0.39 KU/L
D PTERONYSS IGE QN: <0.1 KU/L
DOG DANDER IGE QN: 0.24 KU/L
ENGL PLANTAIN IGE QN: <0.1 KU/L
GOOSEFOOT IGE QN: <0.1 KU/L
JOHNSON GRASS IGE QN: <0.1 KU/L
KENT BLUE GRASS IGE QN: <0.1 KU/L
LONDON PLANE IGE QN: <0.1 KU/L
MT JUNIPER IGE QN: <0.1 KU/L
P NOTATUM IGE QN: <0.1 KU/L
PECAN/HICK TREE IGE QN: <0.1 KU/L
ROACH IGE QN: <0.1 KU/L
SALTWORT IGE QN: <0.1 KU/L
SHEEP SORREL IGE QN: <0.1 KU/L
SILVER BIRCH IGE QN: <0.1 KU/L
TIMOTHY IGE QN: <0.1 KU/L
TOTAL IGE SMQN RAST: 35.8 KU/L
WHITE ASH IGE QN: <0.1 KU/L
WHITE ELM IGE QN: <0.1 KU/L
WHITE MULBERRY IGE QN: <0.1 KU/L
WHITE OAK IGE QN: <0.1 KU/L

## 2024-04-04 ENCOUNTER — APPOINTMENT (OUTPATIENT)
Dept: PULMONOLOGY | Facility: CLINIC | Age: 39
End: 2024-04-04
Payer: COMMERCIAL

## 2024-04-15 ENCOUNTER — HOSPITAL ENCOUNTER (OUTPATIENT)
Dept: RESPIRATORY THERAPY | Facility: HOSPITAL | Age: 39
Discharge: HOME | End: 2024-04-15
Payer: COMMERCIAL

## 2024-04-15 DIAGNOSIS — R09.02 HYPOXIA: ICD-10-CM

## 2024-04-15 PROCEDURE — 94060 EVALUATION OF WHEEZING: CPT | Performed by: INTERNAL MEDICINE

## 2024-04-15 PROCEDURE — 94618 PULMONARY STRESS TESTING: CPT | Performed by: INTERNAL MEDICINE

## 2024-04-15 PROCEDURE — 94726 PLETHYSMOGRAPHY LUNG VOLUMES: CPT

## 2024-04-15 PROCEDURE — 94729 DIFFUSING CAPACITY: CPT | Performed by: INTERNAL MEDICINE

## 2024-04-15 PROCEDURE — 94726 PLETHYSMOGRAPHY LUNG VOLUMES: CPT | Performed by: INTERNAL MEDICINE

## 2024-04-25 LAB
MGC ASCENT PFT - FEV1 - POST: 3.06
MGC ASCENT PFT - FEV1 - PRE: 3.27
MGC ASCENT PFT - FEV1 - PREDICTED: 2.68
MGC ASCENT PFT - FVC - POST: 3.6
MGC ASCENT PFT - FVC - PRE: 3.95
MGC ASCENT PFT - FVC - PREDICTED: 3.19

## 2024-05-10 NOTE — PROGRESS NOTES
Patient: Luis Antonio Clark    [id number]  : 1985 -- AGE 38 y.o.    Provider: Marcy Lazaro CNP     Location [facility]   Service Date: 2024            [facility]  New Visit Note      HISTORY OF PRESENT ILLNESS     The patient's referring provider is: No ref. provider found    HISTORY OF PRESENT ILLNESS   Luis Antonio Clark is a 38 y.o. female who presents to a [facility] for an evaluation with concerns of No chief complaint on file.. I have independently interviewed and examined the patient in the office and reviewed available records.    Current History 3/4/2024    On today's visit, the patient reports during the 2-3 rd trimester of her 2nd pregnancy she developed HTN, in  in Oct it went up again. She did not kow if weight related, she lost 10-12 lbs. She was seen by Cardiologist for workup. She had sleep study - her MD did not get CPAP ordered.   In Oct 2023 had the flu she had cough then improved. In  had URI - There was concern for asthma.  At baseline,  denies dyspnea on exertion/ none at rest. She is active in her everyday life and carries loads and does strenuous exercise. He is only troubled by breathlessness except on strenuous exercise (mMRC 0).   She speed walks around the neighborhood 30-45 min around the neighborhood.       Denies orthopnea, pnd, or thelma.  Has lost X 12 pounds in the last X 3months.  Relates   chronic cough, at night has a clear productive phlegm.  She has occ wheezing at night evening time. Denies green, blood streaks. No hemoptysis. No fever or shivering chills. Has a runny nose, and a tingling sensation in the back of his throat - takes daily allegra since September Denies chest pain or heartburn.     Previous pulmonary history:  no history of recurrent infections, or lung disease as a child.  No previous lung hx, never on oxygen or inhaler therapy.     Inhalers/nebulized  medications: has albuterol using every night it helps    Hospitalization History: Not been hospitalized over the last year for breathing related problem.    Sleep history:  Complains of snoring, denies apnea, denies feeling tired during the day, and taking naps during the day.     Current History    On today's visit, the patient reports no more cough.  She is using the inhaler without difficulty. Not needing albuterol. Denies wheezing, Fevers/chills, Denies PIERSON or SOB at rest or chest pain  Allergies- no rhinorhea or post nasal drip   GERD denies  ED visits none   Up to date on vaccines  Night time - no night time cough      ALLERGIES AND MEDICATIONS     ALLERGIES  Allergies   Allergen Reactions    Ibuprofen Hives and Itching     TAKES ALEVE AT HOME       MEDICATIONS  Current Outpatient Medications   Medication Sig Dispense Refill    fluticasone furoate-vilanteroL (Breo Ellipta) 50-25 mcg/dose blister with device Inhale 1 puff 2 times a day. 60 each 3    labetalol (Normodyne) 200 mg tablet Take 1 tablet (200 mg) by mouth 2 times a day. 180 tablet 3    multivitamin tablet Take 1 tablet by mouth once daily.       No current facility-administered medications for this visit.         PAST HISTORY     PAST MEDICAL HISTORY  She  has a past medical history of Allergic () and Hypertension (2016).    PAST SURGICAL HISTORY  Past Surgical History:   Procedure Laterality Date     SECTION, CLASSIC       SECTION, LOW TRANSVERSE  2016       IMMUNIZATION HISTORY  Immunization History   Administered Date(s) Administered    Flu vaccine (IIV4), preservative free *Check age/dose* 2022    Influenza, Unspecified 2023    Tdap vaccine, age 7 year and older (BOOSTRIX, ADACEL) 2011       SOCIAL HISTORY  She  reports that she has never smoked. She has never used smokeless tobacco. She reports that she does not currently use alcohol. She reports that she does not use drugs. She Patient      OCCUPATIONAL/ENVIRONMENTAL HISTORY  Currently works as:  at a "[employer]"   DOES/DOES NOT EC: does not have known exposure to asbestos, silica, beryllium or inhaled metals.  DOES/DOES NOT EC: does not have exposure to birds or exotic animals. Has a cat     FAMILY HISTORY  Family History   Problem Relation Name Age of Onset    Hyperthyroidism Mother      Hypertension Father Arnulfo Galan     Diabetes Father Arnulfo Galan     Diabetes type II Father Arnulfo Galan     Stroke Maternal Grandmother Katerina Young     Heart disease Maternal Grandfather Sai Young     Leukemia Maternal Grandfather Sai Young     Cancer Maternal Grandfather Sai Young     Heart attack Maternal Grandfather Sai Young     Hypotension Paternal Grandmother Yessy Galan     Asthma Paternal Grandmother Yessy Galan     COPD Paternal Grandmother Yessy Galan     Arthritis Paternal Grandmother Yessy Galan     Anemia Paternal Grandmother Yessy Galan     Asthma Father's Brother Talha Galan      DOES/DOES NOT EC: does have a family history of pulmonary disease.  DOES/DOES NOT EC: does have a family history of cancer.  DOES/DOES NOT EC: does have a family history of autoimmune disorders. Mom has thyroid, brother has crohns    RESULTS/DATA     Pulmonary Function Test Results        Complete Pulmonary Function Test Pre/Post Bronchodialator (Spirometry Pre/Post/DLCO/Lung Volumes)    Study Result  4/15/2024    Narrative & Impression   The FEV1/FVC is normal. The FEV1 is normal. The FVC is normal. Following administration of bronchodilators, there is no significant response. The TLC by body plethysmography is normal. The DLCO is normal; however, the diffusing capacity was not corrected for the patient's hemoglobin. The airways resistance is normal.   Conclusion: Normal spirometry. Normal lung volumes by plethysmography. The DLCO is normal.    6 M WALK TEST ABLE TO WALK TOTAL OF 384M,  "WITH NO DESATURATION BUT DYSPNEA GRADE 2 , NO CHANGE IN HR.                                             Chest Radiograph     No testing done       Chest CT Scan     No testing done       Echocardiogram     No testing done       REVIEW OF SYSTEMS     REVIEW OF SYSTEMS  Review of Systems   All other systems reviewed and are negative.        PHYSICAL EXAM     VITAL SIGNS: There were no vitals taken for this visit. There were no vitals taken for this visit. BP (!) 144/93   Pulse 68   Ht 1.575 m (5' 2\")   Wt 87.1 kg (192 lb)   SpO2 99%   BMI 35.12 kg/m²      CURRENT WEIGHT: [unfilled]  BMI: [unfilled]  PREVIOUS WEIGHTS:  Wt Readings from Last 3 Encounters:   03/04/24 86.2 kg (190 lb)   11/17/23 87.5 kg (193 lb)   11/03/23 87.5 kg (193 lb)       Physical Exam  Constitutional:       Appearance: Normal appearance.   HENT:      Head: Normocephalic and atraumatic.      Right Ear: External ear normal.      Left Ear: External ear normal.      Nose: Nose normal.      Mouth/Throat:      Mouth: Mucous membranes are moist.      Pharynx: Oropharynx is clear.   Eyes:      Extraocular Movements: Extraocular movements intact.      Conjunctiva/sclera: Conjunctivae normal.      Pupils: Pupils are equal, round, and reactive to light.   Cardiovascular:      Rate and Rhythm: Normal rate and regular rhythm.      Pulses: Normal pulses.      Heart sounds: Normal heart sounds.   Pulmonary:      Effort: Pulmonary effort is normal.      Breath sounds: Normal breath sounds.   Abdominal:      General: Bowel sounds are normal.      Palpations: Abdomen is soft.   Musculoskeletal:         General: Normal range of motion.      Cervical back: Normal range of motion and neck supple.   Skin:     General: Skin is warm and dry.   Neurological:      General: No focal deficit present.      Mental Status: She is alert and oriented to person, place, and time. Mental status is at baseline.   Psychiatric:         Mood and Affect: Mood normal.         Behavior: " Behavior normal.         Thought Content: Thought content normal.         Judgment: Judgment normal.         ASSESSMENT/PLAN     Ms. Clark is a 38 y.o. female and  has a past medical history of Allergic (2000) and Hypertension (02/01/2016). She was referred to the [facility] for evaluation of cough, allergic rhinitis, and     Problem List and Orders      Assessment and Plan / Recommendations:  Problem List Items Addressed This Visit    None       Cough dyspnea with exertion   - cough improves with albuterol as needed - not using as managed with ICS/LABA  - Pulmonary function test - normal spirometer with normal lung volumes and normal diffusion capacity   - FENO 41   - Cont low dose ICS/LABA - Breo 1 puff twice a day and rinse after use   - add singulair nightly     Radames  Sleep study on 12/24/2023 reveals - AHI3% at 12.6 per hour- Supine AHI 3% 14.3 per hour, non supine AHI3% 8% per hour. O2 efrain 64.3%  PCP ordered Pap therapy Auto pap 5-15cm H2O  F/U Imani Yesenia today     allergic rhinitis  - purchase nasal saline over the counter - use 2-3 x per day to rinse out nasal passages and keep them moist   - start fluticasone (flonase) 1 spray each nostril 1-2 x per day - remember to aim towards your ear   - cont allegra   - eosinophilic 560, Ig E wnl, high allergy to cats, low to dust mites,         Thank you for visiting the Pulmonary clinic today!       Return to clinic after __3-6 months or sooner if needed   Marcy Lazaro CNP  My office number is [phone] -     Best way to get a hold of me is to call my office --> Please do not send me follow my health messages  Any test results will be discussed at next visit -- please make sure to make a follow up appt after testing.

## 2024-05-14 NOTE — PROGRESS NOTES
Patient: Luis Antonio Clark  : 1985 AGE: 38 y.o. SEX:female   MRN: [id number]   Provider: GOVIND Guadarrama     Location [facility]   Service Date: 2024     PCP: Benito Fernandez DO   Referred by: Marcy Lazaro AP*          [facility]  New Visit Note      HISTORY OF PRESENT ILLNESS     Luis Antonio Clark is a 38 y.o. female with a h/o Hypertension, DEBORAH, Obesity, and hypothyroidism  who presents to [facility] for a comprehensive sleep medicine evaluation     24: NPV referred by pulmonolgy, Marcy Lazaro with concerns of DEBORAH management, recently started on CPAP. Patient was diagnosed with DEBORAH in  by HSAT and was started on CPAP. Currently on auto-CPAP 5-15 cm H2O with EPR/Flex 3 and nasal pillow mask thru [employer]. Patient has been using machine every night. Patient denies machine problems, mask leak, air hunger, aerophagia, dry mouth, skin irritation, and nasal congestion. Complains of denies. The following are patient's perceived benefits of PAP: no snoring on PAP, refreshing sleep, decreased daytime sleepiness and/or fatigue, decreased nocturnal awakening, and better quality of sleep.    SLEEP STUDY HISTORY (personally reviewed raw data such as interpretation report, data sheet, hypnogram, and titration table if available and applicable)  - HSAT 23- mild DEBORAH with AHI 12.6, SpO2 efrain 64.3%. SpO2 <=88% for 1.7 min     Media Information        SLEEP-WAKE SCHEDULE    Sleep Patterns: In terms of the patient's sleep/wake cycle, she generally gets into bed at approximately 10:30 PM.  She reports  her latency to sleep onset after lights out is 30-60 min. During the night, the patient generally awakens 0 times nightly. Final wake time on weekend mornings is around 6-7 AM. Compared to weekdays (work week), the patient's sleep schedule is  similar on the weekends (off work).     Breathing during  sleep: snoring (without CPAP)  Behaviors at night: No   Sleep paralysis: No   Hypnogogic or hypnopompic hallucinations: No   Cataplexy: No     Leg symptoms and timing:  - Sensations: Patient does not have unusual sensations in their extremities that cause an urge to move them     Daytime Symptoms:  On awakening patient reports: waking refreshed    Patient denies daytime symptoms including: Denies: excessive daytime sleepiness sleep inertia late to work/school due to sleepiness irritability during the day difficulty with memory or concentration during the day feeling sleepy when driving    Sleep environment:  Preferred sleep position: side  Room is dark: Yes  Room is quiet: Yes  Room is cool: Yes  Bed comfort: good    SLEEP HABITS  Caffeine consumption: Yes, rarely (occasional)  Alcohol consumption: No  Smoking: No  Marijuana: No  Sleep aids: denies     WEIGHT: stable    ESS: 2  ENA: 6    REVIEW OF SYSTEMS     All other systems have been reviewed and are negative.    ALLERGIES     Allergies   Allergen Reactions    Ibuprofen Hives and Itching     TAKES ALEVE AT HOME       MEDICATIONS     Current Outpatient Medications   Medication Sig Dispense Refill    fexofenadine HCl (ALLEGRA ORAL) Take by mouth once daily.      labetalol (Normodyne) 200 mg tablet Take 1 tablet (200 mg) by mouth 2 times a day. 180 tablet 3    multivitamin tablet Take 1 tablet by mouth once daily.      fluticasone furoate-vilanteroL (Breo Ellipta) 50-25 mcg/dose blister with device Inhale 1 puff 2 times a day. 60 each 3     No current facility-administered medications for this visit.       PAST HISTORIES     PERTINENT PAST MEDICAL HISTORY: See HPI    PERTINENT PAST SURGICAL HISTORY for Sleep Medicine:  non-contributory    PERTINENT FAMILY HISTORY for Sleep Medicine:  sleep apnea on PAP- uncle    PERTINENT SOCIAL HISTORY:  She  reports that she has never smoked. She has never used smokeless tobacco. She reports that she does not currently use  "alcohol. She reports that she does not use drugs. 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     Active Problems, Allergy List, Medication List, and PMH/PSH/FH/Social Hx have been reviewed and reconciled in chart. No significant changes unless documented in the pertinent chart section. Updates made when necessary.     PHYSICAL EXAM     VITAL SIGNS: BP (!) 144/93   Pulse 68   Resp 18   Ht 1.575 m (5' 2\")   Wt 87.1 kg (192 lb)   SpO2 99%   BMI 35.12 kg/m²     CURRENT WEIGHT:   Vitals:    05/20/24 1254   Weight: 87.1 kg (192 lb)        PREVIOUS WEIGHTS:  Wt Readings from Last 3 Encounters:   05/20/24 87.1 kg (192 lb)   03/04/24 86.2 kg (190 lb)   11/17/23 87.5 kg (193 lb)       Physical Exam  Constitutional: Awake, not in distress  Lungs: Clear to auscultation bilateral, no cough noted  Heart: Regular rate and rhythm  Skin: Warm, no rash  Neuro: No tremors, moves all extremities  Psych: alert and oriented to time, place, and person    HEENT:   Tonsils enlargement grade 1+   Airway comments: narrow lateral walls   Tongue scalloping: slight   Modified Mallampati score - 2    RESULTS/DATA     No results found for: \"IRON\", \"TRANSFERRIN\", \"IRONSAT\", \"TIBC\", \"FERRITIN\"    Bicarbonate   Date Value Ref Range Status   10/31/2023 29 21 - 32 mmol/L Final       ASSESSMENT/PLAN     Ms. Clark is a 38 y.o. female and She was referred to the [facility] for evaluation of DEBORAH    Problem List, Orders, Assessment, Recommendations:    #DEBORAH, mild  - HSAT 12/24/23- mild DEBORAH with AHI 12.6, SpO2 efrain 64.3%. SpO2 <=88% for 1.7 min  - CPAP set up 03/2024   - Retrieved and personally reviewed recent PAP adherence download data today. See HPI.  -  good compliance to PAP therapy, residual AHI at goal, and good control of DEBORAH symptoms  - continue current setting 5-15 CWP  - renew PAP supply orders, order placed to [facility]  - diet, exercise, and weight loss were emphasized " today in clinic, as were non-supine sleep, avoiding alcohol in the late evening, and driving or operating heavy machinery when sleepy. Patient verbalized understanding.     #HTN  BP Readings from Last 1 Encounters:   05/20/24 (!) 144/93     - doing well, asymptomatic, denies any headache, blurry vision, chest pain, palpitation, dizziness, lightheadedness, or syncopal episodes  - discussed at length the impact of untreated DEBORAH and BP control  - supportive management: low salt DASH diet (less than 2000 mg sodium intake daily), moderate intensity aerobic exercise at least 30 minutes 5 days per week, reduce stress, quit smoking, limit alcohol, lose weight, and monitor BP once daily  - continue current management and follow-up with PCP    #Obesity  BMI Readings from Last 1 Encounters:   05/20/24 35.12 kg/m²     - Encouraged healthy weight loss via diet and exercise  - Weight loss can help in the long term treatment of DEBORAH.  - Defer management to PCP     All of patient's questions were answered. She verbalizes understanding and agreement with my assessment and plan.    Disposition    Return to clinic in 12 months

## 2024-05-17 PROBLEM — G47.33 OSA (OBSTRUCTIVE SLEEP APNEA): Status: ACTIVE | Noted: 2024-05-17

## 2024-05-20 ENCOUNTER — OFFICE VISIT (OUTPATIENT)
Dept: SLEEP MEDICINE | Facility: CLINIC | Age: 39
End: 2024-05-20
Payer: COMMERCIAL

## 2024-05-20 ENCOUNTER — OFFICE VISIT (OUTPATIENT)
Dept: PULMONOLOGY | Facility: CLINIC | Age: 39
End: 2024-05-20
Payer: COMMERCIAL

## 2024-05-20 VITALS
BODY MASS INDEX: 35.33 KG/M2 | HEIGHT: 62 IN | HEART RATE: 68 BPM | OXYGEN SATURATION: 99 % | WEIGHT: 192 LBS | SYSTOLIC BLOOD PRESSURE: 144 MMHG | DIASTOLIC BLOOD PRESSURE: 93 MMHG

## 2024-05-20 VITALS
SYSTOLIC BLOOD PRESSURE: 144 MMHG | HEIGHT: 62 IN | BODY MASS INDEX: 35.33 KG/M2 | OXYGEN SATURATION: 99 % | DIASTOLIC BLOOD PRESSURE: 93 MMHG | HEART RATE: 68 BPM | WEIGHT: 192 LBS | RESPIRATION RATE: 18 BRPM

## 2024-05-20 DIAGNOSIS — I10 PRIMARY HYPERTENSION: ICD-10-CM

## 2024-05-20 DIAGNOSIS — Z78.9 NEVER SMOKED CIGARETTES: ICD-10-CM

## 2024-05-20 DIAGNOSIS — G47.33 OSA (OBSTRUCTIVE SLEEP APNEA): Primary | ICD-10-CM

## 2024-05-20 DIAGNOSIS — J45.20 MILD INTERMITTENT EXTRINSIC ASTHMA WITHOUT COMPLICATION (HHS-HCC): ICD-10-CM

## 2024-05-20 PROCEDURE — 3080F DIAST BP >= 90 MM HG: CPT | Performed by: NURSE PRACTITIONER

## 2024-05-20 PROCEDURE — 99214 OFFICE O/P EST MOD 30 MIN: CPT | Performed by: NURSE PRACTITIONER

## 2024-05-20 PROCEDURE — 1036F TOBACCO NON-USER: CPT | Performed by: NURSE PRACTITIONER

## 2024-05-20 PROCEDURE — 3077F SYST BP >= 140 MM HG: CPT | Performed by: NURSE PRACTITIONER

## 2024-05-20 PROCEDURE — 3008F BODY MASS INDEX DOCD: CPT | Performed by: NURSE PRACTITIONER

## 2024-05-20 RX ORDER — FLUTICASONE FUROATE AND VILANTEROL TRIFENATATE 50; 25 UG/1; UG/1
1 POWDER RESPIRATORY (INHALATION) 2 TIMES DAILY
Qty: 60 EACH | Refills: 3 | Status: SHIPPED | OUTPATIENT
Start: 2024-05-20 | End: 2024-06-19

## 2024-05-20 RX ORDER — MONTELUKAST SODIUM 10 MG/1
10 TABLET ORAL NIGHTLY
Qty: 30 TABLET | Refills: 5 | Status: SHIPPED | OUTPATIENT
Start: 2024-05-20 | End: 2024-11-16

## 2024-05-20 ASSESSMENT — SLEEP AND FATIGUE QUESTIONNAIRES
SITING INACTIVE IN A PUBLIC PLACE LIKE A CLASS ROOM OR A MOVIE THEATER: WOULD NEVER DOZE
HOW LIKELY ARE YOU TO NOD OFF OR FALL ASLEEP WHEN YOU ARE A PASSENGER IN A CAR FOR AN HOUR WITHOUT A BREAK: SLIGHT CHANCE OF DOZING
DIFFICULTY_STAYING_ASLEEP: MILD
ESS-CHAD TOTAL SCORE: 2
WORRIED_DISTRESSED_DUE_TO_SLEEP: SOMEWHAT
HOW LIKELY ARE YOU TO NOD OFF OR FALL ASLEEP IN A CAR, WHILE STOPPED FOR A FEW MINUTES IN TRAFFIC: WOULD NEVER DOZE
HOW LIKELY ARE YOU TO NOD OFF OR FALL ASLEEP WHILE WATCHING TV: WOULD NEVER DOZE
SATISFACTION_WITH_CURRENT_SLEEP_PATTERN: SATISFIED
HOW LIKELY ARE YOU TO NOD OFF OR FALL ASLEEP WHILE SITTING QUIETLY AFTER LUNCH WITHOUT ALCOHOL: WOULD NEVER DOZE
HOW LIKELY ARE YOU TO NOD OFF OR FALL ASLEEP WHILE LYING DOWN TO REST IN THE AFTERNOON WHEN CIRCUMSTANCES PERMIT: SLIGHT CHANCE OF DOZING
SLEEP_PROBLEM_NOTICEABLE_TO_OTHERS: NOT AT ALL NOTICEABLE
HOW LIKELY ARE YOU TO NOD OFF OR FALL ASLEEP WHILE SITTING AND TALKING TO SOMEONE: WOULD NEVER DOZE
HOW LIKELY ARE YOU TO NOD OFF OR FALL ASLEEP WHILE SITTING AND READING: WOULD NEVER DOZE
SLEEP_PROBLEM_INTERFERES_DAILY_ACTIVITIES: A LITTLE

## 2024-05-20 ASSESSMENT — ENCOUNTER SYMPTOMS
LOSS OF SENSATION IN FEET: 0
OCCASIONAL FEELINGS OF UNSTEADINESS: 0
DEPRESSION: 0

## 2024-05-20 ASSESSMENT — PATIENT HEALTH QUESTIONNAIRE - PHQ9
SUM OF ALL RESPONSES TO PHQ9 QUESTIONS 1 AND 2: 0
2. FEELING DOWN, DEPRESSED OR HOPELESS: NOT AT ALL
1. LITTLE INTEREST OR PLEASURE IN DOING THINGS: NOT AT ALL

## 2024-05-20 NOTE — PATIENT INSTRUCTIONS
[facility] Sleep Medicine  DO [address] [place] [address] [address] [place] [address]       NAME: Luis Antonio Clark   DATE: 05/20/24    Your Sleep Provider Today: GOVIND Guadarrama  Your Primary Care Physician: Benito Fernandez DO       DIAGNOSIS:   1. DEBORAH (obstructive sleep apnea)  Referral to Adult Sleep Medicine      2. Primary hypertension            Thank you for coming to the Sleep Medicine Clinic today! Your sleep medicine provider today was: GOVIND Guadarrama Below is a summary of your treatment plan, other important information, and our contact numbers:      TREATMENT PLAN     - Follow-up in 12 months.  - If not already done, sign up for 'My Chart' and send prescription requests or messages through this    Reminders About Your Sleep Apnea    Your sleep apnea is well controlled based on reviewing your PAP Data Report. A supply renewal prescription has been sent to your DME company.     General Reminders:  Continue current machine settings. Continue using machine every night. Need at least 4 hours daily usage.   Remember to clean your mask, tubings, and water chamber regularly as instructed.  Know the replacement schedule of your supplies/ accessories and contact your DME (durable medical equipment) provider if you are due for them.  Avoid driving or operating heavy machinery when drowsy. A person driving while sleepy is 5 times more likely to have an accident. If you feel sleepy, pull over and take a short power nap (sleep for less than 30 minutes). Otherwise, ask somebody to drive you.    Follow-up sooner through MyChart or calling our office if you have any of the following symptoms:  Snoring or stopping breathing while using the machine  Recurrence of fatigue, sleepiness, insomnia, and other symptoms you had prior using machine  Persistent or worsening fatigue or sleepiness despite regular use of machine  Issues tolerating the machine like  bloating sensation, air hunger, too much hot air, too much pressure, taking off mask without recall in the middle of sleep, etc.     Other conservative measures to improve sleep apnea:  Losing weight can lead to some improvement of DEBORAH which means you will need lower pressures in machine to control your DEBORAH. In some patients, they don't need to use the machine after bariatric surgery. Hence, consider medical and/or surgical weight loss especially if your BMI is more than 35.  Avoiding alcohol, sedative-hypnotics, and sedating medications is imperative as these substances can worsen snoring and sleep apnea  If you have nasal congestion or seasonal allergies, improving your breathing through the nose is critical for treating sleep apnea, tolerating CPAP, and improving your sleep; hence, using intranasal steroid spray like Flonase might help. Make sure you know the proper way to use it.  Stay off your back when sleeping.    Common issues with PAP machine:  Mask gets dislodged when turning to the side: Consider getting a CPAP pillow or switching to a mask with hose on top.     Dry mouth:  Your machine has built-in humidifier that heats up the air to prevent dry mouth. It can be adjusted to your comfort. You can try that first and increase setting one level one night at a time to check which setting is comfortable and effective in lessening dry mouth. If dry mouth persists despite humidity setting adjustment, may apply OTC Biotene gel over the gums at bedtime.  If Biotene gel is not effective, consider trying XEROSTOM gel from [employer].  Also, eliminate or reduce dose of meds that can cause dry mouth if possible. Lastly, may try getting a separate room humidifier machine.    Airleaks: Please call DME as they may need to adjust your mask or refit you with a different kind of mask. In addition, you can ask DME for tips on getting a good mask seal and mask fit.     Difficulty tolerating the mask: Contact your DME to try a  "different kind of mask and/or call office to get a referral to Sleep Psychologist for CPAP desensitization. CPAP desensitization technique is a set of strategies that helps patient cope with claustrophobia and anxiety related to wearing mask. Alternatively, we can do a daytime mini-sleep study called PAP-nap trial wherein you will try on different kinds of mask and the sleep technician will try different pressure settings on CPAP and BPAP machines to see which specific pressure is tolerable and comfortable for you.     Water droplets or moisture within the hose and/or mask: This is called rain-out and it is caused by condensation of too much heated humidity on the cooler walls of the hose. If you have rain-out, turn down humidity settings or get a heated hose. If you already have a heated hose, turn up the \"tube temperature\" of the heated hose. Alternatively, if you don't want to get a heated hose or warmer air, may wrap the CPAP hose with stockings to keep it somewhat warm. Also, you need to place the machine on the floor and lower the hose so that water won't travel upward towards your mask.       Maintaining your CPAP/BPAP device:    The humidification chamber (aka water tank or water chamber) needs to be filled with distilled water to prevent buildup of white deposits in the future. If you cannot find distilled water, you can use tap water but expect to have white deposits buildup seen after prolonged use with tap water. If you start seeing white deposits on the water chamber, you can clean it by filling it with equal parts of distilled white vinegar and water. Let the vinegar-water mixture sit for 2 hours, and then rinse it with running tap water. Clean with soap and water then let it dry.     You should try to keep your machine clean in order to work well. Here are some tips to clean PAP supplies / accessories:    Clean the humidification chamber (aka water tank) as well as your mask and tubing at least once a " week with soap and water.   Alternatively, you can fill a sink or basin with warm water and add a little mild detergent, like Ivory dish soap. Gently wipe your supplies with the soapy water to free all the oils and dirt that may have collected. Once that's done, rinse these items with clean water until the soap is gone and let them air dry. You can hang your tubing over the curtain marielle in your bathroom so that it dries.  The mask insert (part of the mask that has contact with your skin) needs to be cleaned with soap and water daily. Another option is to wipe them down with CPAP wipes or baby wipes.    You should replace your mask and tubing frequently in order to prevent bacteria buildup, machine damage, and mask seal issues. The older the mask and hose, the high likelihood that there is bacteria buildup in it especially if they are not cleaned regularly. Dirty filters damage machines because build-up of dust and contaminants can cause machine to over-heat, and in time, damage the motor of machine. Cushions lose their seal over time as most masks are made of plastic and silicone while headgear is made of neoprene. These materials will break down with age and frequent use. Here is the recommended replacement schedule for PAP supplies / accessories:    Twice a month- disposable filters and cushions for nasal mask or nasal pillows.  Once a month- cushion for full face mask  Every 3 months- mask with headgear and PAP tubing (standard or heated hose)  Every 6 months- reusable filter, water chamber, and chin strap     Other useful information:    Some people do not put water in the tank while other people prefers to put water in the tank to prevent mouth dryness. Try to experiment to determine which is more comfortable for you.   In general, new machines have 2 years warranty on parts while health insurance allows you to have a new machine once every 5 years.        IMPORTANT INFORMATION     Call 911 for medical  emergencies.  Our offices are generally open from Monday-Friday, 9 am - 5 pm.  If you need to get in touch with me, you may either call me/my team (number is below) or you can use Zetr[employer].  If a referral for a test, for CPAP, or for another specialist was made, and you have not heard about scheduling this within a week, please call scheduling at [phone] ([phone]).  If you are unable to make your appointment for clinic or an overnight study, kindly call the office at least 48 hours in advance to cancel and reschedule.  If you are on CPAP, please bring your device's card or the device to each clinic appointment.   There are no supporting services by either the sleep doctors or their staff on weekends and Holidays, or after 5 PM on weekdays.   If you have been asked to come to a sleep study, make sure you bring toiletries, a comfy pillow, and any nighttime medications that you may regularly take. Also be sure to eat dinner before you arrive. We generally do not provide meals.      PRESCRIPTIONS     We require 7 days advanced notice for prescription refills. If we do not receive the request in this time, we cannot guarantee that your medication will be refilled in time.      IMPORTANT PHONE NUMBERS       Appointments (for Adult Sleep Clinic): [phone] - option 2  Appointments (For Sleep Studies): [phone] - option 3  Behavioral Sleep Medicine: [phone]  Sleep Surgery: [phone]  ENT (Otolaryngology): [phone]  Headache Clinic (Neurology): [phone]  Neurology: [phone]  Psychiatry: [phone]  Pulmonary Function Testing (PFT) Center: [phone]  Pulmonary Medicine: [phone]  [facility] ([employer] [phone]  [place] ([employer] [phone]  [facility] (DME): [phone]        CONTACTING YOUR SLEEP MEDICINE PROVIDER AND SLEEP TEAM      For issues with your machine or mask interface, please call your DME provider first. DME stands for durable  "medical company. DME is the company who provides you the machine and/or PAP supplies / accessories.   To schedule, cancel, or reschedule SLEEP STUDY APPOINTMENTS, please call the Main Phone Line at [phone] - option 3.   To schedule, cancel, or reschedule CLINIC APPOINTMENTS, you can do it in \"[employer]art\", call [phone] for [facility] office , [phone] for [facility] office to speak with my on site staff, or call the Main Phone Line at [phone] - option 2  For CLINICAL QUESTIONS or MEDICATION REFILLS, please call direct line for Adult Sleep Nurses at [phone].   Lastly, you can also send a message directly to your provider through \"My Chart\", which is the email service through your  Records Account: [email or web address]     Adult Sleep Nurses (Alia Quintana, RN and Chrissy Kc RN):  For clinical questions and refilling prescriptions: [phone]  Email sleep diaries and other documents at: [email or web address]    Office locations for Imani Patterson NP:    [address]  [place] [address] [phone]    [address]  [address]  [place] [address] [phone]      OUR SLEEP TESTING LOCATIONS     Our team will contact you to schedule your sleep study, however, you can contact us as follow:  Main Phone Line (scheduling only): [phone]), option 3    Sleep Testing Locations:   [place] (18 years and older): [address] [place] [address], 2nd floor   [place]arc[place] (18 years and older): [address] [facility]; 4th floor  After hours line: [phone]  [facility] (18 years and older) at [place]: [id number] [place] [address]  After hours line: [phone]   [facility] (Main campus: All ages): [facility], 6th floor. After hours line: [phone]   [place] (5 years and older; younger considered on case-by-case basis): [address]; [facility], [address]. " Scheduling  After hours line: [phone]       Here at [facility], we wish you a restful sleep!    Your sleep medicine provider for this visit was: AMY Guadarrama-CNP

## 2024-06-14 DIAGNOSIS — J45.20 MILD INTERMITTENT EXTRINSIC ASTHMA WITHOUT COMPLICATION (HHS-HCC): ICD-10-CM

## 2024-06-14 RX ORDER — MONTELUKAST SODIUM 10 MG/1
10 TABLET ORAL NIGHTLY
Qty: 90 TABLET | Refills: 2 | Status: SHIPPED | OUTPATIENT
Start: 2024-06-14 | End: 2024-12-11

## 2024-09-04 ENCOUNTER — APPOINTMENT (OUTPATIENT)
Dept: PRIMARY CARE | Facility: CLINIC | Age: 39
End: 2024-09-04
Payer: COMMERCIAL

## 2024-10-14 NOTE — PROGRESS NOTES
Patient: Luis Antonio Clark    [id number]  : 1985 -- AGE 38 y.o.    Provider: Marcy Lazaro CNP     Location [facility]   Service Date: 2024            [facility]  Follow-up Visit Note      HISTORY OF PRESENT ILLNESS     The patient's referring provider is: No ref. provider found    HISTORY OF PRESENT ILLNESS   Luis Antonio Clark is a 38 y.o. female who presents to a [facility] for an evaluation with concerns of Asthma (Follow up ). I have independently interviewed and examined the patient in the office and reviewed available records.    Current History 3/4/2024    On today's visit, the patient reports during the 2-3 rd trimester of her 2nd pregnancy she developed HTN, in  in Oct it went up again. She did not kow if weight related, she lost 10-12 lbs. She was seen by Cardiologist for workup. She had sleep study - her MD did not get CPAP ordered.   In Oct 2023 had the flu she had cough then improved. In  had URI - There was concern for asthma.  At baseline,  denies dyspnea on exertion/ none at rest. She is active in her everyday life and carries loads and does strenuous exercise. He is only troubled by breathlessness except on strenuous exercise (mMRC 0).   She speed walks around the neighborhood 30-45 min around the neighborhood.       Denies orthopnea, pnd, or thelma.  Has lost X 12 pounds in the last X 3months.  Relates   chronic cough, at night has a clear productive phlegm.  She has occ wheezing at night evening time. Denies green, blood streaks. No hemoptysis. No fever or shivering chills. Has a runny nose, and a tingling sensation in the back of his throat - takes daily allegra since September Denies chest pain or heartburn.     Previous pulmonary history:  no history of recurrent infections, or lung disease as a child.  No previous lung hx, never on oxygen or inhaler therapy.     Inhalers/nebulized  medications: has albuterol using every night it helps    Hospitalization History: Not been hospitalized over the last year for breathing related problem.    Sleep history:  Complains of snoring, denies apnea, denies feeling tired during the day, and taking naps during the day.     Current History 5/20/2024    On today's visit, the patient reports no more cough.  She is using the inhaler without difficulty. Not needing albuterol. Denies wheezing, Fevers/chills, Denies PIERSON or SOB at rest or chest pain  Allergies- no rhinorhea or post nasal drip   GERD denies  ED visits none   Up to date on vaccines  Night time - no night time cough    Current History 10/21/2024    On today's visit, the patient reports She has been pretty good. Her breathing is better. She no longer has post nasal drip or throat clearing. Not requiring rescue inhaler. Wheezing and coughing resolved. Denies shortness of breath or wheezing, Denies chest pain.    Denies GERD  Denies Night time symptoms  Using CPAP nightly.   She has been working with her PCP to adjust her antihypertensives, she took her medication at 730 this am       ALLERGIES AND MEDICATIONS     ALLERGIES  Allergies   Allergen Reactions    Ibuprofen Hives and Itching     TAKES ALEVE AT HOME       MEDICATIONS  Current Outpatient Medications   Medication Sig Dispense Refill    labetalol (Normodyne) 200 mg tablet Take 1 tablet (200 mg) by mouth 2 times a day. 180 tablet 3    montelukast (Singulair) 10 mg tablet TAKE 1 TABLET (10 MG) BY MOUTH ONCE DAILY AT BEDTIME. 90 tablet 2    multivitamin tablet Take 1 tablet by mouth once daily.      fexofenadine HCl (ALLEGRA ORAL) Take by mouth once daily. (Patient not taking: Reported on 10/21/2024)      fluticasone furoate-vilanteroL (Breo Ellipta) 50-25 mcg/dose blister with device Inhale 1 puff 2 times a day. 60 each 3     No current facility-administered medications for this visit.         PAST HISTORY     PAST MEDICAL HISTORY  She  has a past  medical history of Allergic () and Hypertension (2016).    PAST SURGICAL HISTORY  Past Surgical History:   Procedure Laterality Date     SECTION, CLASSIC       SECTION, LOW TRANSVERSE  2016       IMMUNIZATION HISTORY  Immunization History   Administered Date(s) Administered    Flu vaccine (IIV4), preservative free *Check age/dose* 2022    Influenza, Unspecified 2023    Influenza, seasonal, injectable 2024    Moderna SARS-CoV-2 Vaccination 2021, 05/15/2021, 2021    Tdap vaccine, age 7 year and older (BOOSTRIX, ADACEL) 2011       SOCIAL HISTORY  She  reports that she has never smoked. She has never used smokeless tobacco. She reports that she does not currently use alcohol. She reports that she does not use drugs. She Patient     OCCUPATIONAL/ENVIRONMENTAL HISTORY  Currently works as:  at a [employer]   DOES/DOES NOT EC: does not have known exposure to asbestos, silica, beryllium or inhaled metals.  DOES/DOES NOT EC: does not have exposure to birds or exotic animals. Has a cat     FAMILY HISTORY  Family History   Problem Relation Name Age of Onset    Hyperthyroidism Mother      Hypertension Father Arnulfo Brannonquez     Diabetes Father Arnulfo Brannonquez     Diabetes type II Father Arnulfo Brannonquez     Stroke Maternal Grandmother Katerina Young     Heart disease Maternal Grandfather Sai Young     Leukemia Maternal Grandfather Sai Young     Cancer Maternal Grandfather Sai Young     Heart attack Maternal Grandfather Sai Young     Hypotension Paternal Grandmother Yessymao Galan     Asthma Paternal Grandmother Yessy Galan     COPD Paternal Grandmother Yessy Galan     Arthritis Paternal Grandmother Yessy Galan     Anemia Paternal Grandmother Yessy Galan     Asthma Father's Brother Talha Adama      DOES/DOES NOT EC: does have a family history of pulmonary disease.  DOES/DOES NOT EC: does have  "a family history of cancer.  DOES/DOES NOT EC: does have a family history of autoimmune disorders. Mom has thyroid, brother has crohns    RESULTS/DATA     Pulmonary Function Test Results        Complete Pulmonary Function Test Pre/Post Bronchodialator (Spirometry Pre/Post/DLCO/Lung Volumes)    Study Result  4/15/2024    Narrative & Impression   The FEV1/FVC is normal. The FEV1 is normal. The FVC is normal. Following administration of bronchodilators, there is no significant response. The TLC by body plethysmography is normal. The DLCO is normal; however, the diffusing capacity was not corrected for the patient's hemoglobin. The airways resistance is normal.   Conclusion: Normal spirometry. Normal lung volumes by plethysmography. The DLCO is normal.    6 M WALK TEST ABLE TO WALK TOTAL OF 384M, WITH NO DESATURATION BUT DYSPNEA GRADE 2 , NO CHANGE IN HR.                                             Chest Radiograph     No testing done       Chest CT Scan     No testing done       Echocardiogram     No testing done       REVIEW OF SYSTEMS     REVIEW OF SYSTEMS  Review of Systems   All other systems reviewed and are negative.        PHYSICAL EXAM     VITAL SIGNS: BP (!) 157/109   Pulse 74   Temp 36.9 °C (98.4 °F) (Temporal)   Ht 1.575 m (5' 2\")   Wt 90.7 kg (200 lb)   SpO2 98%   BMI 36.58 kg/m²  BP (!) 157/109   Pulse 74   Temp 36.9 °C (98.4 °F) (Temporal)   Ht 1.575 m (5' 2\")   Wt 90.7 kg (200 lb)   SpO2 98%   BMI 36.58 kg/m²  BP (!) 157/109   Pulse 74   Temp 36.9 °C (98.4 °F) (Temporal)   Ht 1.575 m (5' 2\")   Wt 90.7 kg (200 lb)   SpO2 98%   BMI 36.58 kg/m²  repeat 162/119    CURRENT WEIGHT: [unfilled]  BMI: [unfilled]  PREVIOUS WEIGHTS:  Wt Readings from Last 3 Encounters:   10/21/24 90.7 kg (200 lb)   05/20/24 87.1 kg (192 lb)   05/20/24 87.1 kg (192 lb)       Physical Exam  Constitutional:       Appearance: Normal appearance.   HENT:      Head: Normocephalic and atraumatic.      Right Ear: External ear " normal.      Left Ear: External ear normal.      Nose: Nose normal.      Mouth/Throat:      Mouth: Mucous membranes are moist.      Pharynx: Oropharynx is clear.   Eyes:      Extraocular Movements: Extraocular movements intact.      Conjunctiva/sclera: Conjunctivae normal.      Pupils: Pupils are equal, round, and reactive to light.   Cardiovascular:      Rate and Rhythm: Normal rate and regular rhythm.      Pulses: Normal pulses.      Heart sounds: Normal heart sounds.   Pulmonary:      Effort: Pulmonary effort is normal.      Breath sounds: Normal breath sounds.   Abdominal:      General: Bowel sounds are normal.      Palpations: Abdomen is soft.   Musculoskeletal:         General: Normal range of motion.      Cervical back: Normal range of motion and neck supple.   Skin:     General: Skin is warm and dry.   Neurological:      General: No focal deficit present.      Mental Status: She is alert and oriented to person, place, and time. Mental status is at baseline.   Psychiatric:         Mood and Affect: Mood normal.         Behavior: Behavior normal.         Thought Content: Thought content normal.         Judgment: Judgment normal.         ASSESSMENT/PLAN     Ms. Clark is a 38 y.o. female and  has a past medical history of Allergic (2000) and Hypertension (02/01/2016). She was referred to the [facility] for evaluation of cough, allergic rhinitis, and     Problem List and Orders      Assessment and Plan / Recommendations:  Problem List Items Addressed This Visit    None       Asthma mild intermittent   Cough dyspnea with exertion now resolved   - cough improves with albuterol as needed - cont low dose ICS/LABA - refilled  - Pulmonary function test - normal spirometer with normal lung volumes and normal diffusion capacity   - FENO 41   - Cont low dose ICS/LABA - Breo 1 puff twice a day and rinse after use   - add singulair nightly - refilled    Radames  Sleep study on 12/24/2023  reveals - AHI3% at 12.6 per hour- Supine AHI 3% 14.3 per hour, non supine AHI3% 8% per hour. O2 efrain 64.3%  PCP ordered Pap therapy Auto pap 5-15cm H2O  F/U Imani Patterson as scheduled    allergic rhinitis  - purchase nasal saline over the counter - use 2-3 x per day to rinse out nasal passages and keep them moist   - trail of using as needed -  fluticasone (flonase) 1 spray each nostril 1-2 x per day - remember to aim towards your ear   - cont allegra   - eosinophilic 560, Ig E wnl, high allergy to cats, low to dust mites,     HTN - referred to PCP     Thank you for visiting the Pulmonary clinic today!       Return to clinic after __3-6 months or sooner if needed   Marcy Lazaro CNP  My office number is [phone] -     Best way to get a hold of me is to call my office --> Please do not send me follow my health messages  Any test results will be discussed at next visit -- please make sure to make a follow up appt after testing.

## 2024-10-21 ENCOUNTER — APPOINTMENT (OUTPATIENT)
Dept: PULMONOLOGY | Facility: CLINIC | Age: 39
End: 2024-10-21
Payer: COMMERCIAL

## 2024-10-21 VITALS
HEART RATE: 74 BPM | HEIGHT: 62 IN | OXYGEN SATURATION: 98 % | DIASTOLIC BLOOD PRESSURE: 109 MMHG | BODY MASS INDEX: 36.8 KG/M2 | SYSTOLIC BLOOD PRESSURE: 157 MMHG | WEIGHT: 200 LBS | TEMPERATURE: 98.4 F

## 2024-10-21 DIAGNOSIS — J30.89 ALLERGIC RHINITIS DUE TO OTHER ALLERGIC TRIGGER, UNSPECIFIED SEASONALITY: Primary | ICD-10-CM

## 2024-10-21 DIAGNOSIS — J45.20 MILD INTERMITTENT EXTRINSIC ASTHMA WITHOUT COMPLICATION (HHS-HCC): ICD-10-CM

## 2024-10-21 PROCEDURE — 3080F DIAST BP >= 90 MM HG: CPT | Performed by: NURSE PRACTITIONER

## 2024-10-21 PROCEDURE — 99214 OFFICE O/P EST MOD 30 MIN: CPT | Performed by: NURSE PRACTITIONER

## 2024-10-21 PROCEDURE — 1036F TOBACCO NON-USER: CPT | Performed by: NURSE PRACTITIONER

## 2024-10-21 PROCEDURE — 3008F BODY MASS INDEX DOCD: CPT | Performed by: NURSE PRACTITIONER

## 2024-10-21 PROCEDURE — 3077F SYST BP >= 140 MM HG: CPT | Performed by: NURSE PRACTITIONER

## 2024-10-21 RX ORDER — MONTELUKAST SODIUM 10 MG/1
10 TABLET ORAL NIGHTLY
Qty: 90 TABLET | Refills: 2 | Status: SHIPPED | OUTPATIENT
Start: 2024-10-21 | End: 2025-04-19

## 2024-10-21 RX ORDER — FLUTICASONE FUROATE AND VILANTEROL TRIFENATATE 50; 25 UG/1; UG/1
1 POWDER RESPIRATORY (INHALATION) 2 TIMES DAILY
Qty: 60 EACH | Refills: 3 | Status: SHIPPED | OUTPATIENT
Start: 2024-10-21 | End: 2024-11-20

## 2024-10-21 ASSESSMENT — ENCOUNTER SYMPTOMS
DEPRESSION: 0
OCCASIONAL FEELINGS OF UNSTEADINESS: 0
LOSS OF SENSATION IN FEET: 0

## 2024-10-21 ASSESSMENT — ASTHMA QUESTIONNAIRES
QUESTION_5 LAST FOUR WEEKS HOW WOULD YOU RATE YOUR ASTHMA CONTROL: COMPLETELY CONTROLLED
QUESTION_3 LAST FOUR WEEKS HOW OFTEN DID YOUR ASTHMA SYMPTOMS (WHEEZING, COUGHING, SHORTNESS OF BREATH, CHEST TIGHTNESS OR PAIN) WAKE YOU UP AT NIGHT OR EARLIER THAN USUAL IN THE MORNING: NOT AT ALL
QUESTION_1 LAST FOUR WEEKS HOW MUCH OF THE TIME DID YOUR ASTHMA KEEP YOU FROM GETTING AS MUCH DONE AT WORK, SCHOOL OR AT HOME: NONE OF THE TIME
ACT_TOTALSCORE: 25
QUESTION_4 LAST FOUR WEEKS HOW OFTEN HAVE YOU USED YOUR RESCUE INHALER OR NEBULIZER MEDICATION (SUCH AS ALBUTEROL): NOT AT ALL
QUESTION_2 LAST FOUR WEEKS HOW OFTEN HAVE YOU HAD SHORTNESS OF BREATH: NOT AT ALL

## 2024-10-21 ASSESSMENT — PATIENT HEALTH QUESTIONNAIRE - PHQ9
1. LITTLE INTEREST OR PLEASURE IN DOING THINGS: NOT AT ALL
SUM OF ALL RESPONSES TO PHQ9 QUESTIONS 1 AND 2: 0
2. FEELING DOWN, DEPRESSED OR HOPELESS: NOT AT ALL

## 2024-10-21 ASSESSMENT — PAIN SCALES - GENERAL: PAINLEVEL_OUTOF10: 0-NO PAIN

## 2024-10-21 NOTE — PATIENT INSTRUCTIONS
Asthma mild intermittent   Cough dyspnea with exertion now resolved   - cough improves with albuterol as needed - cont low dose ICS/LABA  - Pulmonary function test - normal spirometer with normal lung volumes and normal diffusion capacity   - FENO 41   - Cont low dose ICS/LABA - Breo 1 puff twice a day and rinse after use   - add singulair nightly     Radames  Sleep study on 12/24/2023 reveals - AHI3% at 12.6 per hour- Supine AHI 3% 14.3 per hour, non supine AHI3% 8% per hour. O2 efrain 64.3%  PCP ordered Pap therapy Auto pap 5-15cm H2O  F/U Imanimichelle Patterson as scheduled    allergic rhinitis  - purchase nasal saline over the counter - use 2-3 x per day to rinse out nasal passages and keep them moist   - trail of using as needed -  fluticasone (flonase) 1 spray each nostril 1-2 x per day - remember to aim towards your ear   - cont allegra   - eosinophilic 560, Ig E wnl, high allergy to cats, low to dust mites,         Thank you for visiting the Pulmonary clinic today!       Return to clinic after __3-6 months or sooner if needed   Marcy Lazaro CNP  My office number is [phone] -     Best way to get a hold of me is to call my office --> Please do not send me follow my health messages  Any test results will be discussed at next visit -- please make sure to make a follow up appt after testing.

## 2024-11-08 ENCOUNTER — APPOINTMENT (OUTPATIENT)
Dept: PRIMARY CARE | Facility: CLINIC | Age: 39
End: 2024-11-08
Payer: COMMERCIAL

## 2024-11-08 VITALS
HEART RATE: 73 BPM | WEIGHT: 197 LBS | SYSTOLIC BLOOD PRESSURE: 143 MMHG | TEMPERATURE: 97 F | BODY MASS INDEX: 36.25 KG/M2 | OXYGEN SATURATION: 98 % | RESPIRATION RATE: 18 BRPM | HEIGHT: 62 IN | DIASTOLIC BLOOD PRESSURE: 92 MMHG

## 2024-11-08 DIAGNOSIS — I10 PRIMARY HYPERTENSION: ICD-10-CM

## 2024-11-08 DIAGNOSIS — E66.812 CLASS 2 OBESITY WITH BODY MASS INDEX (BMI) OF 35.0 TO 35.9 IN ADULT, UNSPECIFIED OBESITY TYPE, UNSPECIFIED WHETHER SERIOUS COMORBIDITY PRESENT: Primary | ICD-10-CM

## 2024-11-08 DIAGNOSIS — Z00.00 HEALTHCARE MAINTENANCE: ICD-10-CM

## 2024-11-08 DIAGNOSIS — G47.33 OSA (OBSTRUCTIVE SLEEP APNEA): ICD-10-CM

## 2024-11-08 DIAGNOSIS — R73.9 HYPERGLYCEMIA: ICD-10-CM

## 2024-11-08 LAB — POC HEMOGLOBIN A1C: 5.6 % (ref 4.2–6.5)

## 2024-11-08 PROCEDURE — 3077F SYST BP >= 140 MM HG: CPT | Performed by: FAMILY MEDICINE

## 2024-11-08 PROCEDURE — 83036 HEMOGLOBIN GLYCOSYLATED A1C: CPT | Performed by: FAMILY MEDICINE

## 2024-11-08 PROCEDURE — 3008F BODY MASS INDEX DOCD: CPT | Performed by: FAMILY MEDICINE

## 2024-11-08 PROCEDURE — 99395 PREV VISIT EST AGE 18-39: CPT | Performed by: FAMILY MEDICINE

## 2024-11-08 PROCEDURE — 1036F TOBACCO NON-USER: CPT | Performed by: FAMILY MEDICINE

## 2024-11-08 PROCEDURE — 3080F DIAST BP >= 90 MM HG: CPT | Performed by: FAMILY MEDICINE

## 2024-11-08 RX ORDER — LABETALOL 200 MG/1
1 TABLET, FILM COATED ORAL 2 TIMES DAILY
Qty: 180 TABLET | Refills: 3 | Status: SHIPPED | OUTPATIENT
Start: 2024-11-08

## 2024-11-08 RX ORDER — LOSARTAN POTASSIUM 25 MG/1
25 TABLET ORAL DAILY
Qty: 30 TABLET | Refills: 3 | Status: SHIPPED | OUTPATIENT
Start: 2024-11-08

## 2024-11-08 ASSESSMENT — ENCOUNTER SYMPTOMS
ARTHRALGIAS: 0
POLYDIPSIA: 0
BLOOD IN STOOL: 0
SORE THROAT: 0
CHEST TIGHTNESS: 0
FATIGUE: 0
POLYPHAGIA: 0
ABDOMINAL PAIN: 0
BRUISES/BLEEDS EASILY: 0
FREQUENCY: 0
SINUS PAIN: 0
DIZZINESS: 0
BACK PAIN: 0
COUGH: 0

## 2024-11-08 NOTE — ASSESSMENT & PLAN NOTE
Compliant with   treatmentReviewed sleep study workup and findings dated 5/20/2022 showed pulse ox less than 88% with setting 5-15 yet blood pressure remains elevated that is despite good control with compliance with sleep apnea equipment will add on medication to current regimen

## 2024-11-08 NOTE — PROGRESS NOTES
Luis Antonio Clark is a 38 y.o. female here today a periodic health exam.  I reviewed previous preventative health measures including screening tests, immunizations and labs.      HPI   CURRENT COMPLAINTS OR CONCERNS:   Hypertension and monitoring feels is uncontrolled with blood pressure normally 140s      PREVIOUS PREVENTATIVE HEALTH  Colonoscopy : NO  Cologuard : N/A  Mammogram : NO  Pap Test :  YES -- DATE   Hepatitis C Antibody : N/A  HIV Screening : N/A  Prevnar : NO  Tdap : YES -- DATE   Shingrix : NO  Yearly Flu Shot : YES    CURRENT FINDINGS  Healthy Diet : YES  Exercise :  YES --  walking  Depression Issues : NO ....grief   Alcohol Use : NO  Tobacco Use : NO        Current Outpatient Medications:     fluticasone furoate-vilanteroL (Breo Ellipta) 50-25 mcg/dose blister with device, Inhale 1 puff 2 times a day., Disp: 60 each, Rfl: 3    montelukast (Singulair) 10 mg tablet, Take 1 tablet (10 mg) by mouth once daily at bedtime., Disp: 90 tablet, Rfl: 2    multivitamin tablet, Take 1 tablet by mouth once daily., Disp: , Rfl:     labetalol (Normodyne) 200 mg tablet, Take 1 tablet (200 mg) by mouth 2 times a day., Disp: 180 tablet, Rfl: 3    losartan (Cozaar) 25 mg tablet, Take 1 tablet (25 mg) by mouth once daily., Disp: 30 tablet, Rfl: 3    Past Medical History:   Diagnosis Date    Allergic     Hypertension 2016       Past Surgical History:   Procedure Laterality Date     SECTION, CLASSIC       SECTION, LOW TRANSVERSE  2016       Family History   Problem Relation Name Age of Onset    Hyperthyroidism Mother      Hypertension Father Arnulfo Galan     Diabetes Father Arnulfo Galan     Diabetes type II Father Arnulfo Adama     Stroke Father Arnulfo Galan     Asthma Father's Brother Talha Adama     Stroke Maternal Grandmother Katerina Young     Heart disease Maternal Grandfather Sai Young     Leukemia Maternal Grandfather Sai Young     Cancer Maternal  Grandfather Sai Young     Heart attack Maternal Grandfather Sai Young     Hypotension Paternal Grandmother Yessy Galan     Asthma Paternal Grandmother Yessy Galan     COPD Paternal Grandmother Yessy Galan     Arthritis Paternal Grandmother Yessy Galan     Anemia Paternal Grandmother Yessy Galan        Social History     Tobacco Use    Smoking status: Never    Smokeless tobacco: Never   Vaping Use    Vaping status: Never Used   Substance Use Topics    Alcohol use: Not Currently     Comment: rarely drink if ever    Drug use: Never       Immunization History   Administered Date(s) Administered    DT (pediatric) 11/05/1999    Flu vaccine (IIV4), preservative free *Check age/dose* 10/10/2019, 09/28/2020, 01/26/2022, 09/29/2023    Flu vaccine, quadrivalent, no egg protein, age 6 month or greater (FLUCELVAX) 10/14/2016, 09/19/2018, 09/17/2021, 09/23/2022    Flu vaccine, trivalent, preservative free, age 6 months and greater (Fluarix/Fluzone/Flulaval) 11/19/2013    Flu vaccine, trivalent, preservative free, no egg protein, age 6 months or greater (Flucelvax) 09/20/2024    Influenza, Unspecified 09/29/2023    Influenza, seasonal, injectable 09/20/2024    MMR vaccine, subcutaneous (MMR II) 01/31/1998    Moderna COVID-19 vaccine, bivalent, blue cap/gray label *Check age/dose* 09/24/2022    Moderna SARS-CoV-2 Vaccination 04/11/2021, 05/15/2021, 12/01/2021    Tdap vaccine, age 7 year and older (BOOSTRIX, ADACEL) 04/06/2011       Review of Systems   Constitutional:  Negative for fatigue.   HENT:  Negative for ear pain, sinus pain and sore throat.    Eyes:  Negative for visual disturbance.   Respiratory:  Negative for cough and chest tightness.    Cardiovascular:  Negative for chest pain.   Gastrointestinal:  Negative for abdominal pain and blood in stool.   Endocrine: Negative for cold intolerance, heat intolerance, polydipsia, polyphagia and polyuria.   Genitourinary:  Negative for  frequency and menstrual problem.   Musculoskeletal:  Negative for arthralgias and back pain.   Skin:  Negative for rash.   Neurological:  Negative for dizziness.   Hematological:  Does not bruise/bleed easily.         Objective    Visit Vitals  BP (!) 143/92   Pulse 73   Temp 36.1 °C (97 °F)   Resp 18       Physical Exam  Vitals reviewed.   Constitutional:       Appearance: Normal appearance.   HENT:      Head: Normocephalic.      Right Ear: External ear normal.      Left Ear: External ear normal.      Nose: Nose normal.      Mouth/Throat:      Mouth: Mucous membranes are moist.   Eyes:      Conjunctiva/sclera: Conjunctivae normal.   Cardiovascular:      Rate and Rhythm: Regular rhythm.      Heart sounds: Normal heart sounds.   Pulmonary:      Effort: Pulmonary effort is normal.      Breath sounds: Normal breath sounds.   Abdominal:      General: Bowel sounds are normal.      Palpations: Abdomen is soft.   Musculoskeletal:         General: Normal range of motion.      Cervical back: Neck supple.   Skin:     General: Skin is warm and dry.   Neurological:      General: No focal deficit present.      Mental Status: She is alert and oriented to person, place, and time.   Psychiatric:         Behavior: Behavior normal.         Judgment: Judgment normal.            Assessment    1. Class 2 obesity with body mass index (BMI) of 35.0 to 35.9 in adult, unspecified obesity type, unspecified whether serious comorbidity present        2. Primary hypertension  labetalol (Normodyne) 200 mg tablet, losartan (Cozaar) 25 mg tablet, CBC and Auto Differential, Comprehensive Metabolic Panel, Lipid Panel, Thyroid Stimulating Hormone      3. DEBORAH (obstructive sleep apnea)        4. Hyperglycemia  POCT glycosylated hemoglobin (Hb A1C) manually resulted      5. Healthcare maintenance          Hypertension  Remains uncontrolled  will add     arb    Obesity  See wrap up     DEBORAH (obstructive sleep apnea)  Compliant with   treatmentReviewed  sleep study workup and findings dated 5/20/2022 showed pulse ox less than 88% with setting 5-15 yet blood pressure remains elevated that is despite good control with compliance with sleep apnea equipment will add on medication to current regimen    Healthcare maintenance  Blood  pressure  is   stable Monitor your blood pressure at home and notify if blood pressure consistently over 140 systolic 90 diastolic   See dentist twice yearly  Get checked yearly  30 minutes of moderate intensity exercise 5 days weekly  Application of sunscreen every 2 hours during peak times  Regular sleep schedule  At least 8 hours nightly of sleep..  Limit TV/screen time 30 minutes prior to bedtime          PREVIOUS PREVENTATIVE HEALTH  Colonoscopy : NO  Cologuard : N/A  Mammogram : NO  Pap Test :  YES -- DATE 2/24  Hepatitis C Antibody : N/A  HIV Screening : N/A  Prevnar : NO  Tdap : YES -- DATE 4/21  Shingrix : NO  Yearly Flu Shot : YES  Follow-up 4 weeks for blood pressure check

## 2024-11-08 NOTE — PATIENT INSTRUCTIONS

## 2024-11-20 ENCOUNTER — APPOINTMENT (OUTPATIENT)
Dept: CARDIOLOGY | Facility: CLINIC | Age: 39
End: 2024-11-20
Payer: COMMERCIAL

## 2024-12-04 ENCOUNTER — APPOINTMENT (OUTPATIENT)
Dept: CARDIOLOGY | Facility: CLINIC | Age: 39
End: 2024-12-04
Payer: COMMERCIAL

## 2024-12-04 VITALS
SYSTOLIC BLOOD PRESSURE: 142 MMHG | HEART RATE: 72 BPM | DIASTOLIC BLOOD PRESSURE: 90 MMHG | WEIGHT: 197.14 LBS | BODY MASS INDEX: 34.93 KG/M2 | HEIGHT: 63 IN

## 2024-12-04 DIAGNOSIS — I10 PRIMARY HYPERTENSION: ICD-10-CM

## 2024-12-04 DIAGNOSIS — Z78.9 NEVER SMOKED CIGARETTES: ICD-10-CM

## 2024-12-04 DIAGNOSIS — G47.33 OSA ON CPAP: ICD-10-CM

## 2024-12-04 DIAGNOSIS — J45.20 MILD INTERMITTENT ASTHMA WITHOUT COMPLICATION (HHS-HCC): ICD-10-CM

## 2024-12-04 PROBLEM — E66.9 OBESITY: Status: RESOLVED | Noted: 2023-10-04 | Resolved: 2024-12-04

## 2024-12-04 PROCEDURE — 3008F BODY MASS INDEX DOCD: CPT | Performed by: INTERNAL MEDICINE

## 2024-12-04 PROCEDURE — 1036F TOBACCO NON-USER: CPT | Performed by: INTERNAL MEDICINE

## 2024-12-04 PROCEDURE — 99214 OFFICE O/P EST MOD 30 MIN: CPT | Performed by: INTERNAL MEDICINE

## 2024-12-04 PROCEDURE — 3080F DIAST BP >= 90 MM HG: CPT | Performed by: INTERNAL MEDICINE

## 2024-12-04 PROCEDURE — 3077F SYST BP >= 140 MM HG: CPT | Performed by: INTERNAL MEDICINE

## 2024-12-04 RX ORDER — LOSARTAN POTASSIUM 25 MG/1
50 TABLET ORAL DAILY
Qty: 180 TABLET | Refills: 3 | OUTPATIENT
Start: 2024-12-04 | End: 2025-12-04

## 2024-12-04 NOTE — PATIENT INSTRUCTIONS
Patient to follow up in 1 year with Dr. Talha Nunez MD      Please INCREASE Losartan to 50mg once daily.   Take two of your 25mg tablets together to accommodate this dose.     No other changes today.   Continue same medications and treatments.   Patient educated on proper medication use.   Patient educated on risk factor modification.   Please bring any lab results from other providers / physicians to your next appointment.     Please bring all medicines, vitamins, and herbal supplements with you when you come to the office.     Prescriptions will not be filled unless you are compliant with your follow up appointments or have a follow up appointment scheduled as per instruction of your physician. Refills should be requested at the time of your visit.    IMarciano RN am scribing for and in the presence of Dr. Talha Lyon MD

## 2024-12-04 NOTE — PROGRESS NOTES
CARDIOLOGY OFFICE VISIT      CHIEF COMPLAINT      HISTORY OF PRESENT ILLNESS  The patient states that recently her blood pressure started going up again.  She was started on losartan.  She is not having a problem with that.  I told her to increase it to 50 mg a day.  She will be seeing her family physician in the near future and he will make further adjustments if needed for her blood pressure medications.  She denies chest discomfort.  She denies any significant problem with dyspnea.  She denies palpitations syncope.  She states that since I saw her she was diagnosed with bronchial asthma and obstructive sleep apnea.  She is on CPAP for obstructive sleep apnea.    Impression:  Hypertension  Obstructive Sleep Apnea being managed with CPAP  Asthma, uncomplicated   Obesity     Please excuse any errors in grammar or translation related to this dictation.  Voice recognition software was utilized to prepare this document.    Past Medical History  Past Medical History:   Diagnosis Date    Allergic 2000    Hypertension 02/01/2016       Social History  Social History     Tobacco Use    Smoking status: Never    Smokeless tobacco: Never   Vaping Use    Vaping status: Never Used   Substance Use Topics    Alcohol use: Not Currently     Comment: rarely drink if ever    Drug use: Never       Family History     Family History   Problem Relation Name Age of Onset    Hyperthyroidism Mother      Hypertension Father Arnulfo Galan     Diabetes Father Arnulfo Galan     Diabetes type II Father Arnulfo Galan     Stroke Father Arnulfo Galan     Asthma Father's Brother Talha Adama     Stroke Maternal Grandmother Katerina Young     Heart disease Maternal Grandfather Sai Young     Leukemia Maternal Grandfather Sai Young     Cancer Maternal Grandfather Sai Young     Heart attack Maternal Grandfather Sai Young     Hypotension Paternal Grandmother Yessy Galan     Asthma Paternal Grandmother Yessy Galan      COPD Paternal Grandmother Yessy Galan     Arthritis Paternal Grandmother Yessy Galan     Anemia Paternal Grandmother Yessy Galan         Allergies:  Allergies   Allergen Reactions    Ibuprofen Hives and Itching     TAKES ALEVE AT HOME        Outpatient Medications:  Current Outpatient Medications   Medication Instructions    fluticasone furoate-vilanteroL (Breo Ellipta) 50-25 mcg/dose blister with device 1 puff, inhalation, 2 times daily    labetalol (NORMODYNE) 200 mg, oral, 2 times daily    losartan (COZAAR) 25 mg, oral, Daily    montelukast (SINGULAIR) 10 mg, oral, Nightly    multivitamin tablet 1 tablet, Daily          REVIEW OF SYSTEMS  Review of Systems   All other systems reviewed and are negative.        VITALS  Vitals:    12/04/24 0958   BP: 142/90   Pulse: 72       PHYSICAL EXAM  Vitals reviewed.   Constitutional:       Appearance: Normal and healthy appearance. Well-developed and not in distress.   Eyes:      Conjunctiva/sclera: Conjunctivae normal.      Pupils: Pupils are equal, round, and reactive to light.   Neck:      Vascular: No JVR. JVD normal.   Pulmonary:      Effort: Pulmonary effort is normal.      Breath sounds: Normal breath sounds. No wheezing. No rhonchi. No rales.   Chest:      Chest wall: Not tender to palpatation.   Cardiovascular:      PMI at left midclavicular line. Normal rate. Regular rhythm. Normal S1. Normal S2.       Murmurs: There is no murmur.      No gallop.  No click. No rub.   Pulses:     Intact distal pulses.   Edema:     Peripheral edema absent.   Abdominal:      Tenderness: There is no abdominal tenderness.   Musculoskeletal: Normal range of motion.         General: No tenderness.      Cervical back: Normal range of motion. Skin:     General: Skin is warm and dry.   Neurological:      General: No focal deficit present.      Mental Status: Alert and oriented to person, place and time.   Psychiatric:         Behavior: Behavior is cooperative.            ASSESSMENT AND PLAN  Diagnoses and all orders for this visit:  Primary hypertension  BMI 35.0-35.9,adult  DEBORAH on CPAP  Mild intermittent asthma without complication ([facility])  Never smoked cigarettes      [unfilled]

## 2024-12-06 ENCOUNTER — APPOINTMENT (OUTPATIENT)
Dept: PRIMARY CARE | Facility: CLINIC | Age: 39
End: 2024-12-06
Payer: COMMERCIAL

## 2024-12-19 ENCOUNTER — APPOINTMENT (OUTPATIENT)
Dept: PRIMARY CARE | Facility: CLINIC | Age: 39
End: 2024-12-19
Payer: COMMERCIAL

## 2024-12-19 VITALS
HEIGHT: 63 IN | RESPIRATION RATE: 18 BRPM | SYSTOLIC BLOOD PRESSURE: 111 MMHG | OXYGEN SATURATION: 96 % | WEIGHT: 194 LBS | DIASTOLIC BLOOD PRESSURE: 78 MMHG | HEART RATE: 83 BPM | TEMPERATURE: 97 F | BODY MASS INDEX: 34.38 KG/M2

## 2024-12-19 DIAGNOSIS — I10 HYPERTENSION, UNSPECIFIED TYPE: Primary | ICD-10-CM

## 2024-12-19 DIAGNOSIS — I10 PRIMARY HYPERTENSION: ICD-10-CM

## 2024-12-19 PROCEDURE — 3008F BODY MASS INDEX DOCD: CPT | Performed by: FAMILY MEDICINE

## 2024-12-19 PROCEDURE — 3078F DIAST BP <80 MM HG: CPT | Performed by: FAMILY MEDICINE

## 2024-12-19 PROCEDURE — 1036F TOBACCO NON-USER: CPT | Performed by: FAMILY MEDICINE

## 2024-12-19 PROCEDURE — 99213 OFFICE O/P EST LOW 20 MIN: CPT | Performed by: FAMILY MEDICINE

## 2024-12-19 PROCEDURE — 3074F SYST BP LT 130 MM HG: CPT | Performed by: FAMILY MEDICINE

## 2024-12-19 RX ORDER — LOSARTAN POTASSIUM 50 MG/1
50 TABLET ORAL DAILY
Qty: 90 TABLET | Refills: 3 | Status: SHIPPED | OUTPATIENT
Start: 2024-12-19 | End: 2025-12-19

## 2024-12-19 NOTE — ASSESSMENT & PLAN NOTE
Since last visit patient has seen cardiology.  Review of note dated 12/4/2024 states blood pressure had been going up she was advised increase losartan 50 mg a day.  DEBORAH being managed on CPAP.  Blood pressure at the time of evaluation at cardiologist office 142/90.  Otherwise she feels with change of med i.e. increasing dose much better control.  She remains on Normodyne    200 mg twice daily  stable and continue meds

## 2025-01-25 DIAGNOSIS — J45.20 MILD INTERMITTENT EXTRINSIC ASTHMA WITHOUT COMPLICATION (HHS-HCC): ICD-10-CM

## 2025-01-27 RX ORDER — FLUTICASONE FUROATE AND VILANTEROL TRIFENATATE 50; 25 UG/1; UG/1
1 POWDER RESPIRATORY (INHALATION) 2 TIMES DAILY
Qty: 180 EACH | Refills: 1 | Status: SHIPPED | OUTPATIENT
Start: 2025-01-27

## 2025-03-24 ENCOUNTER — APPOINTMENT (OUTPATIENT)
Dept: PULMONOLOGY | Facility: CLINIC | Age: 40
End: 2025-03-24
Payer: COMMERCIAL

## 2025-04-16 NOTE — PROGRESS NOTES
Patient: Luis Antonio Clark    [id number]  : 1985 -- AGE 39 y.o.    Provider: Marcy Lazaro CNP     Location [facility]   Service Date: 2025            [facility]  Follow-up Visit Note      HISTORY OF PRESENT ILLNESS     The patient's referring provider is: No ref. provider found    HISTORY OF PRESENT ILLNESS   Luis Antonio Clark is a 39 y.o. female who presents to a [facility] for an evaluation with concerns of No chief complaint on file.. I have independently interviewed and examined the patient in the office and reviewed available records.    Current History 3/4/2024    On today's visit, the patient reports during the 2-3 rd trimester of her 2nd pregnancy she developed HTN, in  in Oct it went up again. She did not kow if weight related, she lost 10-12 lbs. She was seen by Cardiologist for workup. She had sleep study - her MD did not get CPAP ordered.   In Oct 2023 had the flu she had cough then improved. In  had URI - There was concern for asthma.  At baseline,  denies dyspnea on exertion/ none at rest. She is active in her everyday life and carries loads and does strenuous exercise. He is only troubled by breathlessness except on strenuous exercise (mMRC 0).   She speed walks around the neighborhood 30-45 min around the neighborhood.       Denies orthopnea, pnd, or thelma.  Has lost X 12 pounds in the last X 3months.  Relates   chronic cough, at night has a clear productive phlegm.  She has occ wheezing at night evening time. Denies green, blood streaks. No hemoptysis. No fever or shivering chills. Has a runny nose, and a tingling sensation in the back of his throat - takes daily allegra since September Denies chest pain or heartburn.     Previous pulmonary history:  no history of recurrent infections, or lung disease as a child.  No previous lung hx, never on oxygen or inhaler therapy.     Inhalers/nebulized  medications: has albuterol using every night it helps    Hospitalization History: Not been hospitalized over the last year for breathing related problem.    Sleep history:  Complains of snoring, denies apnea, denies feeling tired during the day, and taking naps during the day.     Current History 5/20/2024    On today's visit, the patient reports no more cough.  She is using the inhaler without difficulty. Not needing albuterol. Denies wheezing, Fevers/chills, Denies PIERSON or SOB at rest or chest pain  Allergies- no rhinorhea or post nasal drip   GERD denies  ED visits none   Up to date on vaccines  Night time - no night time cough    Current History 10/21/2024    On today's visit, the patient reports She has been pretty good. Her breathing is better. She no longer has post nasal drip or throat clearing. Not requiring rescue inhaler. Wheezing and coughing resolved. Denies shortness of breath or wheezing, Denies chest pain.    Denies GERD  Denies Night time symptoms  Using CPAP nightly.   She has been working with her PCP to adjust her antihypertensives, she took her medication at 730 this am     4/21/2025  On today's visit, the patient reports no respiratory illness or ER visits. Only had a cold.  No wheezing. NO shortness of breath.   Denies Fevers/chills or chest tightness  Has not used rescue inhaler for a couple months.   Denies any night time complaints   ACT 25    ALLERGIES AND MEDICATIONS     ALLERGIES  Allergies   Allergen Reactions    Ibuprofen Hives and Itching     TAKES ALEVE AT HOME       MEDICATIONS  Current Outpatient Medications   Medication Sig Dispense Refill    Breo Ellipta 50-25 mcg/dose blister with device INHALE 1 PUFF BY MOUTH 2 TIMES A  each 1    labetalol (Normodyne) 200 mg tablet Take 1 tablet (200 mg) by mouth 2 times a day. 180 tablet 3    losartan (Cozaar) 50 mg tablet Take 1 tablet (50 mg) by mouth once daily. 90 tablet 3    montelukast (Singulair) 10 mg tablet Take 1 tablet (10 mg)  by mouth once daily at bedtime. 90 tablet 2    multivitamin tablet Take 1 tablet by mouth once daily.       No current facility-administered medications for this visit.         PAST HISTORY     PAST MEDICAL HISTORY  She  has a past medical history of Allergic () and Hypertension (2016).    PAST SURGICAL HISTORY  Past Surgical History:   Procedure Laterality Date     SECTION, CLASSIC       SECTION, LOW TRANSVERSE  2016       IMMUNIZATION HISTORY  Immunization History   Administered Date(s) Administered    DT (pediatric) 1999    Flu vaccine (IIV4), preservative free *Check age/dose* 10/10/2019, 2020, 2022, 2023    Flu vaccine, quadrivalent, no egg protein, age 6 month or greater (FLUCELVAX) 10/14/2016, 2018, 2021, 2022    Flu vaccine, trivalent, preservative free, age 6 months and greater (Fluarix/Fluzone/Flulaval) 2013    Flu vaccine, trivalent, preservative free, no egg protein, age 6 months or greater (Flucelvax) 2024    Influenza, Unspecified 2023    Influenza, seasonal, injectable 2024    MMR vaccine, subcutaneous (MMR II) 1998    Moderna COVID-19 vaccine, bivalent, blue cap/gray label *Check age/dose* 2022    Moderna SARS-CoV-2 Vaccination 2021, 05/15/2021, 2021    Tdap vaccine, age 7 year and older (BOOSTRIX, ADACEL) 2011       SOCIAL HISTORY  She  reports that she has never smoked. She has never used smokeless tobacco. She reports that she does not currently use alcohol. She reports that she does not use drugs. She Patient     OCCUPATIONAL/ENVIRONMENTAL HISTORY  Currently works as:  at a [employer]   DOES/DOES NOT EC: does not have known exposure to asbestos, silica, beryllium or inhaled metals.  DOES/DOES NOT EC: does not have exposure to birds or exotic animals. Has a cat     FAMILY HISTORY  Family History   Problem Relation Name Age of Onset    Hyperthyroidism  Mother      Hypertension Father Arnulfo Glassz     Diabetes Father Arnulfo Galan     Diabetes type II Father Arnulfo Galan     Stroke Father Arnulfo Galan     Asthma Father's Brother Talha Galan     Stroke Maternal Grandmother Katerina Young     Heart disease Maternal Grandfather Sai Young     Leukemia Maternal Grandfather Sai Young     Cancer Maternal Grandfather Sai Young     Heart attack Maternal Grandfather Sai Young     Hypotension Paternal Grandmother Yessy Galan     Asthma Paternal Grandmother Yessy Galan     COPD Paternal Grandmother Yessy Brannonquez     Arthritis Paternal Grandmother Yessy Galan     Anemia Paternal Grandmother Yessy Brannonquez      DOES/DOES NOT EC: does have a family history of pulmonary disease.  DOES/DOES NOT EC: does have a family history of cancer.  DOES/DOES NOT EC: does have a family history of autoimmune disorders. Mom has thyroid, brother has crohns    RESULTS/DATA     Pulmonary Function Test Results        Complete Pulmonary Function Test Pre/Post Bronchodialator (Spirometry Pre/Post/DLCO/Lung Volumes)    Study Result  4/15/2024    Narrative & Impression   The FEV1/FVC is normal. The FEV1 is normal. The FVC is normal. Following administration of bronchodilators, there is no significant response. The TLC by body plethysmography is normal. The DLCO is normal; however, the diffusing capacity was not corrected for the patient's hemoglobin. The airways resistance is normal.   Conclusion: Normal spirometry. Normal lung volumes by plethysmography. The DLCO is normal.    6 M WALK TEST ABLE TO WALK TOTAL OF 384M, WITH NO DESATURATION BUT DYSPNEA GRADE 2 , NO CHANGE IN HR.                                             Chest Radiograph     No testing done       Chest CT Scan     No testing done       Echocardiogram     No testing done       REVIEW OF SYSTEMS     REVIEW OF SYSTEMS  Review of Systems   All other systems reviewed and are  "negative.        PHYSICAL EXAM     VITAL SIGNS: There were no vitals taken for this visit. There were no vitals taken for this visit. There were no vitals taken for this visit. /88   Pulse 71   Resp 18   Ht 1.575 m (5' 2\")   Wt 90.6 kg (199 lb 12.8 oz)   SpO2 97%   BMI 36.54 kg/m²      CURRENT WEIGHT: [unfilled]  BMI: [unfilled]  PREVIOUS WEIGHTS:  Wt Readings from Last 3 Encounters:   12/19/24 88 kg (194 lb)   12/04/24 89.4 kg (197 lb 2.2 oz)   11/08/24 89.4 kg (197 lb)       Physical Exam  Constitutional:       Appearance: Normal appearance.   HENT:      Head: Normocephalic and atraumatic.      Right Ear: External ear normal.      Left Ear: External ear normal.      Nose: Nose normal.      Mouth/Throat:      Mouth: Mucous membranes are moist.      Pharynx: Oropharynx is clear.   Eyes:      Extraocular Movements: Extraocular movements intact.      Conjunctiva/sclera: Conjunctivae normal.      Pupils: Pupils are equal, round, and reactive to light.   Cardiovascular:      Rate and Rhythm: Normal rate and regular rhythm.      Pulses: Normal pulses.      Heart sounds: Normal heart sounds.   Pulmonary:      Effort: Pulmonary effort is normal.      Breath sounds: Normal breath sounds.   Abdominal:      General: Bowel sounds are normal.      Palpations: Abdomen is soft.   Musculoskeletal:         General: Normal range of motion.      Cervical back: Normal range of motion and neck supple.   Skin:     General: Skin is warm and dry.   Neurological:      General: No focal deficit present.      Mental Status: She is alert and oriented to person, place, and time. Mental status is at baseline.   Psychiatric:         Mood and Affect: Mood normal.         Behavior: Behavior normal.         Thought Content: Thought content normal.         Judgment: Judgment normal.       ASSESSMENT/PLAN     Ms. Clark is a 39 y.o. female and  has a past medical history of Allergic (2000) and Hypertension (02/01/2016). She was referred to the " [facility] for evaluation of cough, allergic rhinitis, and     Problem List and Orders      Assessment and Plan / Recommendations:  Problem List Items Addressed This Visit    None       Asthma mild intermittent   Cough dyspnea with exertion now resolved   - cough improves with albuterol as needed - cont low dose ICS/LABA - refilled  - Pulmonary function test - normal spirometer with normal lung volumes and normal diffusion capacity   - FENO 41   - Cont low dose ICS/LABA - Breo 1 puff twice a day and rinse after use   - add singulair nightly - refilled    Radames  Sleep study on 12/24/2023 reveals - AHI3% at 12.6 per hour- Supine AHI 3% 14.3 per hour, non supine AHI3% 8% per hour. O2 efrain 64.3%  PCP ordered Pap therapy Auto pap 5-15cm H2O  F/U Imani Patterson as scheduled    allergic rhinitis  - purchase nasal saline over the counter - use 2-3 x per day to rinse out nasal passages and keep them moist   - trail of using as needed -  fluticasone (flonase) 1 spray each nostril 1-2 x per day - remember to aim towards your ear   - cont allegra   - eosinophilic 560, Ig E wnl, high allergy to cats, low to dust mites,     HTN - controlled     Thank you for visiting the Pulmonary clinic today!       Return to clinic after _6 months or sooner if needed   Marcy Lazaro CNP  My office number is [phone] -     Best way to get a hold of me is to call my office --> Please do not send me follow my health messages  Any test results will be discussed at next visit -- please make sure to make a follow up appt after testing.

## 2025-04-21 ENCOUNTER — APPOINTMENT (OUTPATIENT)
Facility: CLINIC | Age: 40
End: 2025-04-21
Payer: COMMERCIAL

## 2025-04-21 VITALS
WEIGHT: 199.8 LBS | BODY MASS INDEX: 36.77 KG/M2 | OXYGEN SATURATION: 97 % | DIASTOLIC BLOOD PRESSURE: 88 MMHG | HEART RATE: 71 BPM | RESPIRATION RATE: 18 BRPM | HEIGHT: 62 IN | SYSTOLIC BLOOD PRESSURE: 133 MMHG

## 2025-04-21 DIAGNOSIS — J45.20 MILD INTERMITTENT EXTRINSIC ASTHMA WITHOUT COMPLICATION (HHS-HCC): ICD-10-CM

## 2025-04-21 PROCEDURE — 99214 OFFICE O/P EST MOD 30 MIN: CPT | Performed by: NURSE PRACTITIONER

## 2025-04-21 PROCEDURE — 3079F DIAST BP 80-89 MM HG: CPT | Performed by: NURSE PRACTITIONER

## 2025-04-21 PROCEDURE — 3008F BODY MASS INDEX DOCD: CPT | Performed by: NURSE PRACTITIONER

## 2025-04-21 PROCEDURE — 1036F TOBACCO NON-USER: CPT | Performed by: NURSE PRACTITIONER

## 2025-04-21 PROCEDURE — 3075F SYST BP GE 130 - 139MM HG: CPT | Performed by: NURSE PRACTITIONER

## 2025-04-21 PROCEDURE — G8433 SCR FOR DEP NOT CPT DOC RSN: HCPCS | Performed by: NURSE PRACTITIONER

## 2025-04-21 RX ORDER — MONTELUKAST SODIUM 10 MG/1
10 TABLET ORAL NIGHTLY
Qty: 90 TABLET | Refills: 2 | Status: SHIPPED | OUTPATIENT
Start: 2025-04-21 | End: 2025-10-18

## 2025-04-21 RX ORDER — FLUTICASONE FUROATE AND VILANTEROL TRIFENATATE 50; 25 UG/1; UG/1
1 POWDER RESPIRATORY (INHALATION) 2 TIMES DAILY
Qty: 180 EACH | Refills: 11 | Status: SHIPPED | OUTPATIENT
Start: 2025-04-21

## 2025-04-21 ASSESSMENT — ASTHMA QUESTIONNAIRES
ACT_TOTALSCORE: 25
QUESTION_5 LAST FOUR WEEKS HOW WOULD YOU RATE YOUR ASTHMA CONTROL: COMPLETELY CONTROLLED
QUESTION_4 LAST FOUR WEEKS HOW OFTEN HAVE YOU USED YOUR RESCUE INHALER OR NEBULIZER MEDICATION (SUCH AS ALBUTEROL): NOT AT ALL
QUESTION_1 LAST FOUR WEEKS HOW MUCH OF THE TIME DID YOUR ASTHMA KEEP YOU FROM GETTING AS MUCH DONE AT WORK, SCHOOL OR AT HOME: NONE OF THE TIME
QUESTION_2 LAST FOUR WEEKS HOW OFTEN HAVE YOU HAD SHORTNESS OF BREATH: NOT AT ALL
QUESTION_3 LAST FOUR WEEKS HOW OFTEN DID YOUR ASTHMA SYMPTOMS (WHEEZING, COUGHING, SHORTNESS OF BREATH, CHEST TIGHTNESS OR PAIN) WAKE YOU UP AT NIGHT OR EARLIER THAN USUAL IN THE MORNING: NOT AT ALL

## 2025-04-21 ASSESSMENT — PATIENT HEALTH QUESTIONNAIRE - PHQ9
2. FEELING DOWN, DEPRESSED OR HOPELESS: NOT AT ALL
1. LITTLE INTEREST OR PLEASURE IN DOING THINGS: NOT AT ALL
SUM OF ALL RESPONSES TO PHQ9 QUESTIONS 1 AND 2: 0

## 2025-04-21 ASSESSMENT — ENCOUNTER SYMPTOMS
OCCASIONAL FEELINGS OF UNSTEADINESS: 0
LOSS OF SENSATION IN FEET: 0
DEPRESSION: 0

## 2025-04-21 NOTE — PATIENT INSTRUCTIONS
Asthma mild intermittent   Cough dyspnea with exertion now resolved   - cough improves with albuterol as needed - cont low dose ICS/LABA - refilled  - Pulmonary function test - normal spirometer with normal lung volumes and normal diffusion capacity   - FENO 41   - Cont low dose ICS/LABA - Breo 1 puff twice a day and rinse after use   - add singulair nightly - refilled    Radames  Sleep study on 12/24/2023 reveals - AHI3% at 12.6 per hour- Supine AHI 3% 14.3 per hour, non supine AHI3% 8% per hour. O2 efrain 64.3%  PCP ordered Pap therapy Auto pap 5-15cm H2O  F/U Imani Patterson as scheduled    allergic rhinitis  - purchase nasal saline over the counter - use 2-3 x per day to rinse out nasal passages and keep them moist   - trail of using as needed -  fluticasone (flonase) 1 spray each nostril 1-2 x per day - remember to aim towards your ear   - cont allegra   - eosinophilic 560, Ig E wnl, high allergy to cats, low to dust mites,     HTN - controlled     Thank you for visiting the Pulmonary clinic today!       Return to clinic after _6 months or sooner if needed   Marcy Lazaro CNP  My office number is [phone] -     Best way to get a hold of me is to call my office --> Please do not send me follow my health messages  Any test results will be discussed at next visit -- please make sure to make a follow up appt after testing.

## 2025-05-20 ENCOUNTER — APPOINTMENT (OUTPATIENT)
Dept: SLEEP MEDICINE | Facility: CLINIC | Age: 40
End: 2025-05-20
Payer: COMMERCIAL

## 2025-07-16 NOTE — PROGRESS NOTES
Patient: Luis Antonio Clark  : 1985 AGE: 39 y.o. SEX:female   MRN: [id number]   Provider: GOVIND Guadarrama     Location [facility]   Service Date: 2025     PCP: Benito Fernandez DO   Referred by: No ref. provider found          [facility]  Follow Up Visit Note      HISTORY OF PRESENT ILLNESS     Luis Antonio Clark is a 39 y.o. female with a h/o Hypertension, DEBORAH, Obesity, and hypothyroidism who presents to [facility] for a comprehensive sleep medicine evaluation     25: Reports doing well with PAP therapy. She is now 9 weeks pregnant, some nights falls asleep before putting mask on.  Reports she is comfortable with current pressure and P10 mask fit. No longer using chinstrap. Denies any bothersome symptoms or air leak. Denies any nasal symptoms on CPAP including rhinorrhea, nasal congestion, postnasal drip or sinusitis. The following are patient's perceived benefits of PAP: no snoring on PAP, refreshing sleep, decreased daytime sleepiness and/or fatigue, decreased nocturnal awakening, and better quality of sleep. Cleaning equipment regularly.  -----> supplies renewed         24: NPV referred by pulmonolgy, Marcy Lazaro with concerns of DEBORAH management, recently started on CPAP. Patient was diagnosed with DEBORAH in  by HSAT and was started on CPAP. Currently on auto-CPAP 5-15 cm H2O with EPR/Flex 3 and nasal pillow mask through [employer]. Patient has been using machine every night. Patient denies machine problems, mask leak, air hunger, aerophagia, dry mouth, skin irritation, and nasal congestion. Complains of denies. The following are patient's perceived benefits of PAP: no snoring on PAP, refreshing sleep, decreased daytime sleepiness and/or fatigue, decreased nocturnal awakening, and better quality of sleep.    SLEEP STUDY HISTORY (personally reviewed raw data such as interpretation report, data  "sheet, hypnogram, and titration table if available and applicable)  - HSAT 12/24/23- mild DEBORAH with AHI 12.6, SpO2 efrain 64.3%. SpO2 <=88% for 1.7 min     Media Information      ESS: 4    REVIEW OF SYSTEMS     All other systems have been reviewed and are negative.    ALLERGIES     Allergies   Allergen Reactions    Ibuprofen Hives and Itching     TAKES ALEVE AT HOME       MEDICATIONS     Current Outpatient Medications   Medication Sig Dispense Refill    fluticasone furoate-vilanteroL (Breo Ellipta) 50-25 mcg/dose blister with device Inhale 1 puff 2 times a day. 180 each 11    labetalol (Normodyne) 200 mg tablet Take 1 tablet (200 mg) by mouth 2 times a day. 180 tablet 3    montelukast (Singulair) 10 mg tablet Take 1 tablet (10 mg) by mouth once daily at bedtime. 90 tablet 2    multivitamin tablet Take 1 tablet by mouth once daily.      losartan (Cozaar) 50 mg tablet Take 1 tablet (50 mg) by mouth once daily. (Patient not taking: Reported on 7/24/2025) 90 tablet 3     No current facility-administered medications for this visit.       PAST HISTORIES     PERTINENT PAST MEDICAL HISTORY: See HPI    PERTINENT PAST SURGICAL HISTORY for Sleep Medicine:  non-contributory    PERTINENT FAMILY HISTORY for Sleep Medicine:  sleep apnea on PAP- uncle    PERTINENT SOCIAL HISTORY:  She  reports that she has never smoked. She has never used smokeless tobacco. She reports that she does not currently use alcohol. She reports that she does not use drugs. 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     Active Problems, Allergy List, Medication List, and PMH/PSH/FH/Social Hx have been reviewed and reconciled in chart. No significant changes unless documented in the pertinent chart section. Updates made when necessary.     PHYSICAL EXAM     VITAL SIGNS: /89   Pulse 76   Resp 18   Ht 1.575 m (5' 2\")   Wt 91.7 kg (202 lb 3.2 oz)   SpO2 98%   BMI 36.98 kg/m²     CURRENT WEIGHT:   Vitals:    " "07/24/25 0928   Weight: 91.7 kg (202 lb 3.2 oz)     PREVIOUS WEIGHTS:  Wt Readings from Last 3 Encounters:   07/24/25 91.7 kg (202 lb 3.2 oz)   04/21/25 90.6 kg (199 lb 12.8 oz)   12/19/24 88 kg (194 lb)     Constitutional: Alert and oriented, cooperative, no obvious distress   HEENT: Non icteric or anemic, EOM WNL bilaterally   Neck: Supple, no JVD, no goiter, no adenopathy, no rigidity     RESULTS/DATA     No results found for: \"IRON\", \"TRANSFERRIN\", \"IRONSAT\", \"TIBC\", \"FERRITIN\"    Bicarbonate   Date Value Ref Range Status   10/31/2023 29 21 - 32 mmol/L Final       ASSESSMENT/PLAN     Ms. Clark is a 39 y.o. female and She was referred to the [facility] for evaluation of DEBORAH    Problem List, Orders, Assessment, Recommendations:    #DEBORAH, mild  - HSAT 12/24/23- mild DEBORAH with AHI 12.6, SpO2 efrain 64.3%. SpO2 <=88% for 1.7 min  - CPAP set up 03/2024   - Retrieved and personally reviewed recent PAP adherence download data today. See HPI.  -  good compliance to PAP therapy, residual AHI at goal, and good control of DEBORAH symptoms ----> encouraged to increase usage and duration   - continue current setting 5-15 CWP  - renew PAP supply orders, order placed to [facility]  - diet, exercise, and weight loss were emphasized today in clinic, as were non-supine sleep, avoiding alcohol in the late evening, and driving or operating heavy machinery when sleepy. Patient verbalized understanding.     #HTN  BP Readings from Last 1 Encounters:   07/24/25 138/89     - doing well, asymptomatic, denies any headache, blurry vision, chest pain, palpitation, dizziness, lightheadedness, or syncopal episodes  - discussed at length the impact of untreated DEBORAH and BP control  - supportive management: low salt DASH diet (less than 2000 mg sodium intake daily), moderate intensity aerobic exercise at least 30 minutes 5 days per week, reduce stress, quit smoking, limit alcohol, lose weight, and monitor BP once daily  - " continue current management and follow-up with PCP    #Obesity  BMI Readings from Last 1 Encounters:   07/24/25 36.98 kg/m²     - Encouraged healthy weight loss via diet and exercise  - Weight loss can help in the long term treatment of DEBORAH.  - Defer management to PCP     All of patient's questions were answered. She verbalizes understanding and agreement with my assessment and plan.    Disposition    Return to clinic in 12 months or sooner if needed     I personally spent 30 minutes today (exclusive of procedures) providing care for this patient, including preparation, face to face time, EMR documentation and other services such as review of medical records, diagnostic results, patient education, counseling, and coordination of care.

## 2025-07-24 ENCOUNTER — APPOINTMENT (OUTPATIENT)
Facility: CLINIC | Age: 40
End: 2025-07-24
Payer: COMMERCIAL

## 2025-07-24 VITALS
BODY MASS INDEX: 37.21 KG/M2 | WEIGHT: 202.2 LBS | OXYGEN SATURATION: 98 % | HEIGHT: 62 IN | SYSTOLIC BLOOD PRESSURE: 138 MMHG | DIASTOLIC BLOOD PRESSURE: 89 MMHG | RESPIRATION RATE: 18 BRPM | HEART RATE: 76 BPM

## 2025-07-24 DIAGNOSIS — I10 PRIMARY HYPERTENSION: ICD-10-CM

## 2025-07-24 DIAGNOSIS — G47.33 OSA (OBSTRUCTIVE SLEEP APNEA): Primary | ICD-10-CM

## 2025-07-24 DIAGNOSIS — E66.9 OBESITY (BMI 30-39.9): ICD-10-CM

## 2025-07-24 PROCEDURE — 1036F TOBACCO NON-USER: CPT | Performed by: NURSE PRACTITIONER

## 2025-07-24 PROCEDURE — 3075F SYST BP GE 130 - 139MM HG: CPT | Performed by: NURSE PRACTITIONER

## 2025-07-24 PROCEDURE — 99214 OFFICE O/P EST MOD 30 MIN: CPT | Performed by: NURSE PRACTITIONER

## 2025-07-24 PROCEDURE — G8433 SCR FOR DEP NOT CPT DOC RSN: HCPCS | Performed by: NURSE PRACTITIONER

## 2025-07-24 PROCEDURE — 3079F DIAST BP 80-89 MM HG: CPT | Performed by: NURSE PRACTITIONER

## 2025-07-24 PROCEDURE — 3008F BODY MASS INDEX DOCD: CPT | Performed by: NURSE PRACTITIONER

## 2025-07-24 ASSESSMENT — SLEEP AND FATIGUE QUESTIONNAIRES
HOW LIKELY ARE YOU TO NOD OFF OR FALL ASLEEP IN A CAR, WHILE STOPPED FOR A FEW MINUTES IN TRAFFIC: WOULD NEVER DOZE
HOW LIKELY ARE YOU TO NOD OFF OR FALL ASLEEP WHILE WATCHING TV: WOULD NEVER DOZE
SITING INACTIVE IN A PUBLIC PLACE LIKE A CLASS ROOM OR A MOVIE THEATER: WOULD NEVER DOZE
HOW LIKELY ARE YOU TO NOD OFF OR FALL ASLEEP WHILE LYING DOWN TO REST IN THE AFTERNOON WHEN CIRCUMSTANCES PERMIT: MODERATE CHANCE OF DOZING
HOW LIKELY ARE YOU TO NOD OFF OR FALL ASLEEP WHEN YOU ARE A PASSENGER IN A CAR FOR AN HOUR WITHOUT A BREAK: MODERATE CHANCE OF DOZING
HOW LIKELY ARE YOU TO NOD OFF OR FALL ASLEEP WHILE SITTING AND TALKING TO SOMEONE: WOULD NEVER DOZE
HOW LIKELY ARE YOU TO NOD OFF OR FALL ASLEEP WHILE SITTING AND READING: WOULD NEVER DOZE
HOW LIKELY ARE YOU TO NOD OFF OR FALL ASLEEP WHILE SITTING QUIETLY AFTER LUNCH WITHOUT ALCOHOL: WOULD NEVER DOZE
ESS-CHAD TOTAL SCORE: 4

## 2025-07-24 ASSESSMENT — COLUMBIA-SUICIDE SEVERITY RATING SCALE - C-SSRS
1. IN THE PAST MONTH, HAVE YOU WISHED YOU WERE DEAD OR WISHED YOU COULD GO TO SLEEP AND NOT WAKE UP?: NO
2. HAVE YOU ACTUALLY HAD ANY THOUGHTS OF KILLING YOURSELF?: NO
6. HAVE YOU EVER DONE ANYTHING, STARTED TO DO ANYTHING, OR PREPARED TO DO ANYTHING TO END YOUR LIFE?: NO

## 2025-07-24 ASSESSMENT — ENCOUNTER SYMPTOMS
OCCASIONAL FEELINGS OF UNSTEADINESS: 0
DEPRESSION: 0
LOSS OF SENSATION IN FEET: 0

## 2025-08-29 ENCOUNTER — HOSPITAL ENCOUNTER (OUTPATIENT)
Dept: RADIOLOGY | Facility: HOSPITAL | Age: 40
Discharge: HOME | End: 2025-08-29
Payer: COMMERCIAL

## 2025-08-29 ENCOUNTER — APPOINTMENT (OUTPATIENT)
Dept: RADIOLOGY | Facility: CLINIC | Age: 40
End: 2025-08-29
Payer: COMMERCIAL

## 2025-08-29 DIAGNOSIS — Z34.90 ENCOUNTER FOR SUPERVISION OF NORMAL PREGNANCY, UNSPECIFIED, UNSPECIFIED TRIMESTER: ICD-10-CM

## 2025-08-29 PROCEDURE — 76801 OB US < 14 WKS SINGLE FETUS: CPT

## 2025-08-29 PROCEDURE — 76830 TRANSVAGINAL US NON-OB: CPT

## 2025-10-13 ENCOUNTER — APPOINTMENT (OUTPATIENT)
Dept: RADIOLOGY | Facility: CLINIC | Age: 40
End: 2025-10-13
Payer: COMMERCIAL

## 2025-10-20 ENCOUNTER — APPOINTMENT (OUTPATIENT)
Facility: CLINIC | Age: 40
End: 2025-10-20
Payer: COMMERCIAL

## 2025-12-03 ENCOUNTER — APPOINTMENT (OUTPATIENT)
Dept: CARDIOLOGY | Facility: CLINIC | Age: 40
End: 2025-12-03
Payer: COMMERCIAL

## 2026-07-28 ENCOUNTER — APPOINTMENT (OUTPATIENT)
Facility: CLINIC | Age: 41
End: 2026-07-28
Payer: COMMERCIAL